# Patient Record
Sex: FEMALE | Race: WHITE | NOT HISPANIC OR LATINO | Employment: OTHER | URBAN - METROPOLITAN AREA
[De-identification: names, ages, dates, MRNs, and addresses within clinical notes are randomized per-mention and may not be internally consistent; named-entity substitution may affect disease eponyms.]

---

## 2018-06-19 ENCOUNTER — OFFICE VISIT (OUTPATIENT)
Dept: URGENT CARE | Facility: CLINIC | Age: 58
End: 2018-06-19
Payer: COMMERCIAL

## 2018-06-19 ENCOUNTER — HOSPITAL ENCOUNTER (OUTPATIENT)
Facility: MEDICAL CENTER | Age: 58
End: 2018-06-19
Attending: PHYSICIAN ASSISTANT
Payer: COMMERCIAL

## 2018-06-19 VITALS
TEMPERATURE: 97.6 F | RESPIRATION RATE: 16 BRPM | SYSTOLIC BLOOD PRESSURE: 110 MMHG | DIASTOLIC BLOOD PRESSURE: 72 MMHG | OXYGEN SATURATION: 95 % | HEART RATE: 80 BPM

## 2018-06-19 DIAGNOSIS — R30.0 DYSURIA: Primary | ICD-10-CM

## 2018-06-19 LAB
APPEARANCE UR: NORMAL
BILIRUB UR STRIP-MCNC: NEGATIVE MG/DL
COLOR UR AUTO: YELLOW
GLUCOSE UR STRIP.AUTO-MCNC: NEGATIVE MG/DL
KETONES UR STRIP.AUTO-MCNC: NEGATIVE MG/DL
LEUKOCYTE ESTERASE UR QL STRIP.AUTO: NORMAL
NITRITE UR QL STRIP.AUTO: NEGATIVE
PH UR STRIP.AUTO: 6 [PH] (ref 5–8)
PROT UR QL STRIP: NEGATIVE MG/DL
RBC UR QL AUTO: NORMAL
SP GR UR STRIP.AUTO: 1.01
UROBILINOGEN UR STRIP-MCNC: NEGATIVE MG/DL

## 2018-06-19 PROCEDURE — 81002 URINALYSIS NONAUTO W/O SCOPE: CPT | Performed by: PHYSICIAN ASSISTANT

## 2018-06-19 PROCEDURE — 87086 URINE CULTURE/COLONY COUNT: CPT

## 2018-06-19 PROCEDURE — 99204 OFFICE O/P NEW MOD 45 MIN: CPT | Performed by: PHYSICIAN ASSISTANT

## 2018-06-19 RX ORDER — PHENAZOPYRIDINE HYDROCHLORIDE 200 MG/1
200 TABLET, FILM COATED ORAL 3 TIMES DAILY
Qty: 6 TAB | Refills: 0 | Status: SHIPPED | OUTPATIENT
Start: 2018-06-19 | End: 2018-06-19 | Stop reason: SDUPTHER

## 2018-06-19 RX ORDER — SULFAMETHOXAZOLE AND TRIMETHOPRIM 800; 160 MG/1; MG/1
1 TABLET ORAL EVERY 12 HOURS
Qty: 14 TAB | Refills: 0 | Status: SHIPPED | OUTPATIENT
Start: 2018-06-19 | End: 2018-06-26

## 2018-06-19 RX ORDER — PHENAZOPYRIDINE HYDROCHLORIDE 200 MG/1
200 TABLET, FILM COATED ORAL 3 TIMES DAILY
Qty: 6 TAB | Refills: 0 | Status: SHIPPED | OUTPATIENT
Start: 2018-06-19 | End: 2018-06-21

## 2018-06-19 RX ORDER — SULFAMETHOXAZOLE AND TRIMETHOPRIM 800; 160 MG/1; MG/1
1 TABLET ORAL EVERY 12 HOURS
Qty: 14 TAB | Refills: 0 | Status: SHIPPED | OUTPATIENT
Start: 2018-06-19 | End: 2018-06-19 | Stop reason: SDUPTHER

## 2018-06-20 DIAGNOSIS — R30.0 DYSURIA: ICD-10-CM

## 2018-06-20 NOTE — PROGRESS NOTES
Subjective:      Pt is a 57 y.o. female who presents with Dysuria (Over a week urinary pain .)            HPI  PT comes into the UC with a chief complaint of dysuria, burning on urination, urgency, frequency, and bladder pressure and has noticed blood in her urine x 7 days. PT denies fevers or chills, CP, SOB, NVD, paresthesias, headaches, dizziness, change in vision, hives, or joint pain. PT states the pain is a 3-4/10 with burning upon urination, aching in nature and worse at night. She states she has not taken any RX meds for this issue. She denies flank or back pain as well. The pt's medication list, problem list, and allergies have been evaluated and reviewed during today's visit.      PMH:  Past Medical History:   Diagnosis Date   • Cholelithiasis    • History of malignant melanoma of skin age 25       PSH:  Past Surgical History:   Procedure Laterality Date   • OTHER      excision of melanoma left lateral rib cage area       Fam Hx:  the patient's family history is not pertinent to their current complaint    Family Status   Relation Status   • Mother Other    no contact with mother   • Father    • Sister Alive   • Brother Alive   • Sister Alive       Soc HX:  Social History     Social History   • Marital status: Single     Spouse name: N/A   • Number of children: 0   • Years of education: N/A     Occupational History   • [employer]  Other     Social History Main Topics   • Smoking status: Former Smoker     Packs/day: 1.00     Years: 35.00     Types: Cigarettes     Quit date: 2014   • Smokeless tobacco: Never Used   • Alcohol use Yes      Comment: rare   • Drug use: No   • Sexual activity: Not Currently     Partners: Male     Other Topics Concern   • Not on file     Social History Narrative   • No narrative on file         Medications:    Current Outpatient Prescriptions:   •  phenazopyridine (PYRIDIUM) 200 MG Tab, Take 1 Tab by mouth 3 times a day for 2 days., Disp: 6 Tab, Rfl: 0  •   sulfamethoxazole-trimethoprim (BACTRIM DS) 800-160 MG tablet, Take 1 Tab by mouth every 12 hours for 7 days., Disp: 14 Tab, Rfl: 0  •  ibuprofen (MOTRIN) 200 MG TABS, Take 200 mg by mouth every 6 hours as needed., Disp: , Rfl:   •  metaxalone (SKELAXIN) 800 MG TABS, Take 1 Tab by mouth 3 times a day., Disp: 45 Tab, Rfl: 0      Allergies:  Patient has no known allergies.    ROS  Review of Systems   Constitutional: Negative for fever, chills and malaise/fatigue.   HENT: Negative for congestion and sore throat.    Eyes: Negative for blurred vision, double vision and photophobia.   Respiratory: Negative for cough and shortness of breath.    Cardiovascular: Negative for chest pain and palpitations.   Gastrointestinal: Negative for nausea, vomiting, abdominal pain, diarrhea and constipation.   Genitourinary: Positive for dysuria, urgency and frequency.    Musculoskeletal: Negative for joint pain and myalgias.   Skin: Negative for rash.   Neurological: Negative for dizziness, tingling and headaches.   Endo/Heme/Allergies: Does not bruise/bleed easily.   Psychiatric/Behavioral: Negative for depression. The patient is not nervous/anxious.           Objective:     /72   Pulse 80   Temp 36.4 °C (97.6 °F)   Resp 16   SpO2 95%      Physical Exam       Physical Exam   Constitutional: She is oriented to person, place, and time. She appears well-developed and well-nourished. No distress.   HENT:   Head: Normocephalic and atraumatic.   Right Ear: External ear normal.   Left Ear: External ear normal.   Nose: Nose normal.   Mouth/Throat: Oropharynx is clear and moist. No oropharyngeal exudate.   Eyes: Conjunctivae normal and EOM are normal. Pupils are equal, round, and reactive to light.   Neck: Normal range of motion. Neck supple. No thyromegaly present.   Cardiovascular: Normal rate, regular rhythm, normal heart sounds and intact distal pulses.  Exam reveals no gallop and no friction rub.    No murmur  heard.  Pulmonary/Chest: Effort normal and breath sounds normal. No respiratory distress. She has no wheezes. She has no rales. She exhibits no tenderness.   Abdominal: Soft. Bowel sounds are normal. She exhibits no distension and no mass. There is no tenderness. There is no rebound and no guarding.   Genitourinary:        Pt deferred   Musculoskeletal: Normal range of motion. She exhibits no edema and no tenderness.   Lymphadenopathy:     She has no cervical adenopathy.   Neurological: She is alert and oriented to person, place, and time. She has normal reflexes. No cranial nerve deficit.   Skin: Skin is warm and dry. No rash noted. No erythema.   Psychiatric: She has a normal mood and affect. Her behavior is normal. Judgment and thought content normal.        Assessment/Plan:     1. Dysuria    - POCT Urinalysis-->LEUKS AND BLOOD  - Urine Culture; Future  - phenazopyridine (PYRIDIUM) 200 MG Tab; Take 1 Tab by mouth 3 times a day for 2 days.  Dispense: 6 Tab; Refill: 0  - sulfamethoxazole-trimethoprim (BACTRIM DS) 800-160 MG tablet; Take 1 Tab by mouth every 12 hours for 7 days.  Dispense: 14 Tab; Refill: 0    Rest, fluids encouraged.  AVS with medical info given.  Pt was in full understanding and agreement with the plan.  Follow-up as needed if symptoms worsen or fail to improve.

## 2018-06-22 ENCOUNTER — TELEPHONE (OUTPATIENT)
Dept: URGENT CARE | Facility: CLINIC | Age: 58
End: 2018-06-22

## 2018-06-22 LAB
BACTERIA UR CULT: NORMAL
SIGNIFICANT IND 70042: NORMAL
SITE SITE: NORMAL
SOURCE SOURCE: NORMAL

## 2018-06-22 NOTE — TELEPHONE ENCOUNTER
Called and left message with pt about urine culture results which came back negative.   I told her she could stop the abx therapy with a neg urine culture.  Encouraged Pt to call back with questions.  Braydon Zuniga PA-C

## 2018-08-01 ENCOUNTER — OFFICE VISIT (OUTPATIENT)
Dept: URGENT CARE | Facility: CLINIC | Age: 58
End: 2018-08-01
Payer: COMMERCIAL

## 2018-08-01 VITALS
RESPIRATION RATE: 18 BRPM | HEIGHT: 67 IN | BODY MASS INDEX: 23.54 KG/M2 | OXYGEN SATURATION: 96 % | HEART RATE: 78 BPM | WEIGHT: 150 LBS | DIASTOLIC BLOOD PRESSURE: 70 MMHG | TEMPERATURE: 98.5 F | SYSTOLIC BLOOD PRESSURE: 110 MMHG

## 2018-08-01 DIAGNOSIS — J01.80 OTHER ACUTE SINUSITIS, RECURRENCE NOT SPECIFIED: ICD-10-CM

## 2018-08-01 DIAGNOSIS — H69.93 DYSFUNCTION OF BOTH EUSTACHIAN TUBES: ICD-10-CM

## 2018-08-01 DIAGNOSIS — R05.9 COUGH: ICD-10-CM

## 2018-08-01 PROCEDURE — 99214 OFFICE O/P EST MOD 30 MIN: CPT | Performed by: FAMILY MEDICINE

## 2018-08-01 RX ORDER — CODEINE PHOSPHATE/GUAIFENESIN 10-100MG/5
10 LIQUID (ML) ORAL 3 TIMES DAILY PRN
Qty: 120 ML | Refills: 0 | Status: SHIPPED | OUTPATIENT
Start: 2018-08-01 | End: 2018-08-06

## 2018-08-01 RX ORDER — AMOXICILLIN 875 MG/1
875 TABLET, COATED ORAL 2 TIMES DAILY
Qty: 20 TAB | Refills: 0 | Status: SHIPPED | OUTPATIENT
Start: 2018-08-01 | End: 2018-08-11

## 2018-08-01 NOTE — PROGRESS NOTES
HPI: Kathy Edmonds is a 57 y.o. female who presents with   Chief Complaint   Patient presents with   • Cough     Over a week dry cough and postnasal dripping .      Patient presents to urgent care with acute onset of a new problem.  She has had 1 week of sinus pressure in the maxillary region malaise some chills.  No fevers there has been fatigue purulent green discharge when she blows her nose she has had some ear pressure she has had a cough has been taking some antihistamine with minimal relief in symptoms.  No history of sinus surgery she states used she is to get twice a year sinusitis until she moved to the [facility] now she gets it once every few years.  Worsened by: activity, laying supine at night, first thing in the morning, when exposed to outside allergens  Improved by: OTC symptomatic medictions    Ros Review of Systems performed. All other systems are negative except for what is listed above.     PMH:  has a past medical history of Cholelithiasis and History of malignant melanoma of skin (age 25).  MEDS:   Current Outpatient Prescriptions:   •  amoxicillin (AMOXIL) 875 MG tablet, Take 1 Tab by mouth 2 times a day for 10 days. With food, Disp: 20 Tab, Rfl: 0  •  guaifenesin-codeine (TUSSI-ORGANIDIN NR) 100-10 MG/5ML syrup, Take 10 mL by mouth 3 times a day as needed for Cough for up to 5 days. Will cause sedation, avoid driving, operating heavy machinery, and drinking alcohol, Disp: 120 mL, Rfl: 0  •  ibuprofen (MOTRIN) 200 MG TABS, Take 200 mg by mouth every 6 hours as needed., Disp: , Rfl:   •  metaxalone (SKELAXIN) 800 MG TABS, Take 1 Tab by mouth 3 times a day., Disp: 45 Tab, Rfl: 0  ALLERGIES: No Known Allergies  SURGHX:   Past Surgical History:   Procedure Laterality Date   • OTHER      excision of melanoma left lateral rib cage area     SOCHX:  reports that she quit smoking about 4 years ago. Her smoking use included Cigarettes. She has a 35.00 pack-year smoking history. She has never used  "smokeless tobacco. She reports that she drinks alcohol. She reports that she does not use drugs.  FH: Family history was reviewed, no pertinent findings to report    PE:  Vitals /70   Pulse 78   Temp 36.9 °C (98.5 °F)   Resp 18   Ht 1.702 m (5' 7\")   Wt 68 kg (150 lb)   SpO2 96%   BMI 23.49 kg/m²    Gen AOx4, NAD  HEENT: moist mucus membranes,  pain and pressure with percussion of bilateral maxillary sinuses.  Bilateral conjunciva clear without erythema or exudate,  Bilateral TM's with erythema bulge, fluid mild pharyngeal erythema without tonsillar exudate or tonsillar enlargement  Neck: supple, no cervical lymphadenopathy, no signs of menigismus  CV/PULM: RRR no murmurs, no rales ronchi or wheezes, no signs of resp distress  Abd soft nontender, bs present  Skin no rashes  Extremities -c/c/e  Neuro appropriate affect,     A/P  1. Other acute sinusitis, recurrence not specified  amoxicillin (AMOXIL) 875 MG tablet    guaifenesin-codeine (TUSSI-ORGANIDIN NR) 100-10 MG/5ML syrup   2. Cough  amoxicillin (AMOXIL) 875 MG tablet    guaifenesin-codeine (TUSSI-ORGANIDIN NR) 100-10 MG/5ML syrup   3. Dysfunction of both eustachian tubes  guaifenesin-codeine (TUSSI-ORGANIDIN NR) 100-10 MG/5ML syrup     Differential diagnosis, natural history, supportive care discussed. Follow-up with primary care provider within 7-10 days, emergency room precautions discussed.  Patient and/or family appears understanding of information.    "

## 2018-09-11 ENCOUNTER — OFFICE VISIT (OUTPATIENT)
Dept: URGENT CARE | Facility: CLINIC | Age: 58
End: 2018-09-11
Payer: COMMERCIAL

## 2018-09-11 VITALS
SYSTOLIC BLOOD PRESSURE: 152 MMHG | BODY MASS INDEX: 23.54 KG/M2 | RESPIRATION RATE: 16 BRPM | HEIGHT: 67 IN | TEMPERATURE: 98.1 F | DIASTOLIC BLOOD PRESSURE: 104 MMHG | WEIGHT: 150 LBS | OXYGEN SATURATION: 99 % | HEART RATE: 72 BPM

## 2018-09-11 DIAGNOSIS — M54.50 ACUTE LEFT-SIDED LOW BACK PAIN WITHOUT SCIATICA: ICD-10-CM

## 2018-09-11 DIAGNOSIS — S39.012A STRAIN OF MUSCLE, FASCIA AND TENDON OF LOWER BACK, INITIAL ENCOUNTER: ICD-10-CM

## 2018-09-11 DIAGNOSIS — M62.838 MUSCLE SPASM: ICD-10-CM

## 2018-09-11 PROCEDURE — 99214 OFFICE O/P EST MOD 30 MIN: CPT | Performed by: NURSE PRACTITIONER

## 2018-09-11 RX ORDER — METAXALONE 800 MG/1
800 TABLET ORAL 3 TIMES DAILY
Qty: 20 TAB | Refills: 0 | Status: SHIPPED | OUTPATIENT
Start: 2018-09-11 | End: 2021-04-16

## 2018-09-11 ASSESSMENT — ENCOUNTER SYMPTOMS
MYALGIAS: 0
CHILLS: 0
BACK PAIN: 1
VOMITING: 0
EYE PAIN: 0
DIZZINESS: 0
SHORTNESS OF BREATH: 0
FEVER: 0
BOWEL INCONTINENCE: 0
SORE THROAT: 0
NAUSEA: 0

## 2018-09-11 ASSESSMENT — PATIENT HEALTH QUESTIONNAIRE - PHQ9: CLINICAL INTERPRETATION OF PHQ2 SCORE: 0

## 2018-09-12 NOTE — PATIENT INSTRUCTIONS
Back Pain, Adult  Back pain is very common in adults. The cause of back pain is rarely dangerous and the pain often gets better over time. The cause of your back pain may not be known. Some common causes of back pain include:  · Strain of the muscles or ligaments supporting the spine.  · Wear and tear (degeneration) of the spinal disks.  · Arthritis.  · Direct injury to the back.  For many people, back pain may return. Since back pain is rarely dangerous, most people can learn to manage this condition on their own.  Follow these instructions at home:  Watch your back pain for any changes. The following actions may help to lessen any discomfort you are feeling:  · Remain active. It is stressful on your back to sit or  one place for long periods of time. Do not sit, drive, or  one place for more than 30 minutes at a time. Take short walks on even surfaces as soon as you are able. Try to increase the length of time you walk each day.  · Exercise regularly as directed by your health care provider. Exercise helps your back heal faster. It also helps avoid future injury by keeping your muscles strong and flexible.  · Do not stay in bed. Resting more than 1-2 days can delay your recovery.  · Pay attention to your body when you bend and lift. The most comfortable positions are those that put less stress on your recovering back. Always use proper lifting techniques, including:  ¨ Bending your knees.  ¨ Keeping the load close to your body.  ¨ Avoiding twisting.  · Find a comfortable position to sleep. Use a firm mattress and lie on your side with your knees slightly bent. If you lie on your back, put a pillow under your knees.  · Avoid feeling anxious or stressed. Stress increases muscle tension and can worsen back pain. It is important to recognize when you are anxious or stressed and learn ways to manage it, such as with exercise.  · Take medicines only as directed by your health care provider.  Over-the-counter medicines to reduce pain and inflammation are often the most helpful. Your health care provider may prescribe muscle relaxant drugs. These medicines help dull your pain so you can more quickly return to your normal activities and healthy exercise.  · Apply ice to the injured area:  ¨ Put ice in a plastic bag.  ¨ Place a towel between your skin and the bag.  ¨ Leave the ice on for 20 minutes, 2-3 times a day for the first 2-3 days. After that, ice and heat may be alternated to reduce pain and spasms.  · Maintain a healthy weight. Excess weight puts extra stress on your back and makes it difficult to maintain good posture.  Contact a health care provider if:  · You have pain that is not relieved with rest or medicine.  · You have increasing pain going down into the legs or buttocks.  · You have pain that does not improve in one week.  · You have night pain.  · You lose weight.  · You have a fever or chills.  Get help right away if:  · You develop new bowel or bladder control problems.  · You have unusual weakness or numbness in your arms or legs.  · You develop nausea or vomiting.  · You develop abdominal pain.  · You feel faint.  This information is not intended to replace advice given to you by your health care provider. Make sure you discuss any questions you have with your health care provider.  Document Released: 12/18/2006 Document Revised: 04/27/2017 Document Reviewed: 04/21/2015  [employer] Interactive Patient Education © 2017 [employer]

## 2018-09-12 NOTE — PROGRESS NOTES
"Subjective:   Kathy Edmonds is a 57 y.o. female who presents for Back Pain (Low back pain, pt. states she pulled a muscle in her lower back this weekend.)  Specifically stated with complaints of progressively worsening back pain ×2 days. Patient was painting a fence when she pulled a muscle in her low back. Patient denies any numbness, tingling, loss of bowel or bladder. Patient has taken ibuprofen with minimal relief in symptoms. Patient denies any chronic back pain.       Back Pain   This is a new problem. Episode onset: 2 days. The problem occurs constantly. The problem is unchanged. The pain is present in the lumbar spine. The quality of the pain is described as shooting. The pain does not radiate. The pain is at a severity of 10/10. The pain is severe. The pain is the same all the time. The symptoms are aggravated by bending, position, standing, sitting and twisting. Stiffness is present all day. Pertinent negatives include no bladder incontinence, bowel incontinence, chest pain or fever. Risk factors include menopause. She has tried nothing for the symptoms. The treatment provided no relief.     Review of Systems   Constitutional: Negative for chills and fever.   HENT: Negative for sore throat.    Eyes: Negative for pain.   Respiratory: Negative for shortness of breath.    Cardiovascular: Negative for chest pain.   Gastrointestinal: Negative for bowel incontinence, nausea and vomiting.   Genitourinary: Negative for bladder incontinence and hematuria.   Musculoskeletal: Positive for back pain. Negative for myalgias.   Skin: Negative for rash.   Neurological: Negative for dizziness.     No Known Allergies   Objective:   /104   Pulse 72   Temp 36.7 °C (98.1 °F)   Resp 16   Ht 1.702 m (5' 7\")   Wt 68 kg (150 lb)   SpO2 99%   Breastfeeding? No   BMI 23.49 kg/m²   Physical Exam   Constitutional: She is oriented to person, place, and time. She appears well-developed and well-nourished. No distress.   "   HENT:   Head: Normocephalic and atraumatic.   Eyes: Pupils are equal, round, and reactive to light. Conjunctivae and EOM are normal.   Cardiovascular: Normal rate and regular rhythm.    No murmur heard.  Pulmonary/Chest: Effort normal and breath sounds normal. No respiratory distress.   Abdominal: Soft. She exhibits no distension. There is no tenderness.   Musculoskeletal:        Lumbar back: She exhibits decreased range of motion, tenderness, pain and spasm.        Back:    Spine- without midline tenderness, step-off or deformity. Without scoliosis or kyphosis. Without noted paraspinous spasm. FROM with lateral bend, and flexion/extension. Without noted tenderness over the sacroiliac notches. Sensation intact bilaterally, BLE motor 5/5 and symmetrical  strength. Negative Straight leg raise.  Gait- WNL without foot drop.      Neurological: She is alert and oriented to person, place, and time. She has normal reflexes. No sensory deficit.   Skin: Skin is warm and dry.   Psychiatric: She has a normal mood and affect.         Assessment/Plan:   Assessment    1. Strain of muscle, fascia and tendon of lower back, initial encounter  metaxalone (SKELAXIN) 800 MG Tab   2. Acute left-sided low back pain without sciatica  metaxalone (SKELAXIN) 800 MG Tab   3. Muscle spasm  metaxalone (SKELAXIN) 800 MG Tab     Avoid bending, stooping, heavy or repetitive lifting until symptoms resolve.  Avoid prolonged sitting; frequent changes of position may be helpful.  Begin gentle stretching before activity when tolerated. May use nsaid therapy. Advised prescribed medication Will cause sedation, avoid driving, operating heavy machinery, and drinking alcohol  Differential diagnosis, natural history, supportive care, and indications for immediate follow-up discussed.

## 2018-09-17 ENCOUNTER — OFFICE VISIT (OUTPATIENT)
Dept: MEDICAL GROUP | Facility: MEDICAL CENTER | Age: 58
End: 2018-09-17
Payer: COMMERCIAL

## 2018-09-17 VITALS
WEIGHT: 152 LBS | HEIGHT: 67 IN | TEMPERATURE: 98 F | RESPIRATION RATE: 16 BRPM | DIASTOLIC BLOOD PRESSURE: 90 MMHG | SYSTOLIC BLOOD PRESSURE: 122 MMHG | OXYGEN SATURATION: 97 % | BODY MASS INDEX: 23.86 KG/M2 | HEART RATE: 90 BPM

## 2018-09-17 DIAGNOSIS — Z85.820 H/O MELANOMA EXCISION: ICD-10-CM

## 2018-09-17 DIAGNOSIS — Z12.31 ENCOUNTER FOR SCREENING MAMMOGRAM FOR BREAST CANCER: ICD-10-CM

## 2018-09-17 DIAGNOSIS — Z12.11 SCREENING FOR COLON CANCER: ICD-10-CM

## 2018-09-17 DIAGNOSIS — Z98.890 H/O MELANOMA EXCISION: ICD-10-CM

## 2018-09-17 DIAGNOSIS — T14.8XXA MUSCLE STRAIN: ICD-10-CM

## 2018-09-17 DIAGNOSIS — Z13.220 LIPID SCREENING: ICD-10-CM

## 2018-09-17 DIAGNOSIS — Z13.29 THYROID DISORDER SCREEN: ICD-10-CM

## 2018-09-17 PROCEDURE — 99214 OFFICE O/P EST MOD 30 MIN: CPT | Performed by: NURSE PRACTITIONER

## 2018-09-17 RX ORDER — METHOCARBAMOL 750 MG/1
750 TABLET, FILM COATED ORAL EVERY 8 HOURS PRN
Qty: 30 TAB | Refills: 0 | Status: SHIPPED | OUTPATIENT
Start: 2018-09-17 | End: 2021-04-16

## 2018-09-17 RX ORDER — METHYLPREDNISOLONE 4 MG/1
TABLET ORAL
Qty: 21 TAB | Refills: 0 | Status: SHIPPED | OUTPATIENT
Start: 2018-09-17 | End: 2020-02-16

## 2018-09-17 NOTE — LETTER
[employer] [facility]  MELANY BanksRANTHONY.  [address]  [place] [address]  Fax: [phone]   Authorization for Release/Disclosure of   Protected Health Information   Name: DEJA MCKINNEY : 1960 SSN: [id number]   Address: [address] [place] [address] [place] [address] Phone:    [phone] (home)    I authorize the entity listed below to release/disclose the PHI below to:   [employer]/MELANY BanksRSUSIE and ROBERT Banks   Provider or Entity Name:  Dr Brooks   Address   City, [facility]   Phone:      Fax:     Reason for request: continuity of care   Information to be released:    [  ] LAST COLONOSCOPY,  including any PATH REPORT and follow-up  [  ] LAST FIT/COLOGUARD RESULT [  ] LAST DEXA  [  ] LAST MAMMOGRAM  [  ] LAST PAP  [  ] LAST LABS [  ] RETINA EXAM REPORT  [  ] IMMUNIZATION RECORDS  [  ] Release all info      [  ] Check here and initial the line next to each item to release ALL health information INCLUDING  _____ Care and treatment for drug and / or alcohol abuse  _____ HIV testing, infection status, or AIDS  _____ Genetic Testing    DATES OF SERVICE OR TIME PERIOD TO BE DISCLOSED: _____________  I understand and acknowledge that:  * This Authorization may be revoked at any time by you in writing, except if your health information has already been used or disclosed.  * Your health information that will be used or disclosed as a result of you signing this authorization could be re-disclosed by the recipient. If this occurs, your re-disclosed health information may no longer be protected by State or Federal laws.  * You may refuse to sign this Authorization. Your refusal will not affect your ability to obtain treatment.  * This Authorization becomes effective upon signing and will  on (date) __________.      If no date is indicated, this Authorization will  one (1) year from the signature date.    Name: Deja Mckinney    Signature:   Date:     2018       PLEASE  FAX REQUESTED RECORDS BACK TO: [phone]

## 2018-09-17 NOTE — PROGRESS NOTES
Subjective:     Chief Complaint   Patient presents with   • Establish Care     CORTISONE SHOT     Kathy Edmonds is a 57 y.o. female here today to establish care, she has not seen a primary care provider in many years.  She has never had mammogram, has not had colonoscopy.  Last labs many years ago.  She is single, works for the state.  She did have Pap smear in 2015 which was normal.  We discussed:     H/O melanoma excision  Excised from the L abdomen, was followed by oncology for 10 years. No recent skin issues    Muscle strain  Primary reason for her visit today. Pt was mistakenly told she could receive a trigger point injection in our office.  Was seen in urgent care 9/11 after straining her lower back while painting. Given prescription for skelaxin which is not helping.  She continues to have lower back discomfort, up to 7 out of 10 with certain movements.  Has difficulty standing from a seated position or turning over when in bed.  She is having muscle spasm in the left lower back.  Denies pain and lower extremities, numbness, tingling, weakness in lower extremities.  No saddle anesthesia       Current medicines (including changes today)  Current Outpatient Prescriptions   Medication Sig Dispense Refill   • methocarbamol (ROBAXIN) 750 MG Tab Take 1 Tab by mouth every 8 hours as needed. 30 Tab 0   • MethylPREDNISolone (MEDROL DOSEPAK) 4 MG Tablet Therapy Pack As directed on the packaging label. 21 Tab 0   • metaxalone (SKELAXIN) 800 MG Tab Take 1 Tab by mouth 3 times a day. 20 Tab 0   • ibuprofen (MOTRIN) 200 MG TABS Take 200 mg by mouth every 6 hours as needed.       No current facility-administered medications for this visit.      She  has a past medical history of Cholelithiasis and History of malignant melanoma of skin (age 25). She also has no past medical history of Asthma.    ROS included above     Objective:     Blood pressure 122/90, pulse 90, temperature 36.7 °C (98 °F), resp. rate 16, height 1.702 m  "(5' 7\"), weight 68.9 kg (152 lb), SpO2 97 %. Body mass index is 23.81 kg/m².     Physical Exam:  General: Alert, oriented in no acute distress.  Eye contact is good, speech is normal, affect calm  Lungs: clear to auscultation bilaterally, normal effort, no wheeze/ rhonchi/ rales.  CV: regular rate and rhythm, S1, S2, no murmur  Abdomen: soft, nontender  MS: SLT negative. DTR 2+ patella. Strength 5/5 prox and distal LE. Mild tenderness in paraspinous muscles lumbar spine without current spasm. No spinous process tenderness.  No pain with stress of SI. Full hip ROM.   Ext: no edema, color normal, vascularity normal, temperature normal    Assessment and Plan:   The following treatment plan was discussed  1. H/O melanoma excision   no recurrence.  Advised follow-up with dermatology for skin check   2. Muscle strain   this is her primary concern today, she strained a muscle in the low back about 2 weeks ago and it is not improving.  She was under the impression she could receive a trigger point injection, this is not something that we do in my office.  Alternative treatment options discussed.  Will start Medrol Dosepak, advised to take medication with food.  May try Robaxin.  Risks benefits and side effects discussed.  MethylPREDNISolone (MEDROL DOSEPAK) 4 MG Tablet Therapy Pack    COMP METABOLIC PANEL   3. Screening for colon cancer  COLOGUARD (FIT DNA)   4. Lipid screening  LIPID PROFILE   5. Thyroid disorder screen  TSH WITH REFLEX TO FT4   6. Encounter for screening mammogram for breast cancer  MA-SCREEN MAMMO W/CAD-BILAT       Followup: pending labs       Please note that this dictation was created using voice recognition software. I have worked with consultants from the vendor as well as technical experts from [employer] to optimize the interface. I have made every reasonable attempt to correct obvious errors, but I expect that there are errors of grammar and possibly content that I did not discover before " finalizing the note.

## 2018-09-17 NOTE — ASSESSMENT & PLAN NOTE
Primary reason for her visit today. Pt was mistakenly told she could receive a trigger point injection in our office.  Was seen in urgent care 9/11 after straining her lower back while painting. Given prescription for skelaxin which is not helping.  She continues to have lower back discomfort, up to 7 out of 10 with certain movements.  Has difficulty standing from a seated position or turning over when in bed.  She is having muscle spasm in the left lower back.  Denies pain and lower extremities, numbness, tingling, weakness in lower extremities.  No saddle anesthesia

## 2018-10-05 ENCOUNTER — HOSPITAL ENCOUNTER (OUTPATIENT)
Dept: LAB | Facility: MEDICAL CENTER | Age: 58
End: 2018-10-05
Attending: NURSE PRACTITIONER
Payer: COMMERCIAL

## 2018-10-05 DIAGNOSIS — Z13.29 THYROID DISORDER SCREEN: ICD-10-CM

## 2018-10-05 DIAGNOSIS — Z13.220 LIPID SCREENING: ICD-10-CM

## 2018-10-05 DIAGNOSIS — T14.8XXA MUSCLE STRAIN: ICD-10-CM

## 2018-10-05 LAB
ALBUMIN SERPL BCP-MCNC: 4.7 G/DL (ref 3.2–4.9)
ALBUMIN/GLOB SERPL: 1.5 G/DL
ALP SERPL-CCNC: 85 U/L (ref 30–99)
ALT SERPL-CCNC: 13 U/L (ref 2–50)
ANION GAP SERPL CALC-SCNC: 7 MMOL/L (ref 0–11.9)
AST SERPL-CCNC: 15 U/L (ref 12–45)
BILIRUB SERPL-MCNC: 1.1 MG/DL (ref 0.1–1.5)
BUN SERPL-MCNC: 16 MG/DL (ref 8–22)
CALCIUM SERPL-MCNC: 10.3 MG/DL (ref 8.5–10.5)
CHLORIDE SERPL-SCNC: 109 MMOL/L (ref 96–112)
CHOLEST SERPL-MCNC: 162 MG/DL (ref 100–199)
CO2 SERPL-SCNC: 23 MMOL/L (ref 20–33)
CREAT SERPL-MCNC: 0.78 MG/DL (ref 0.5–1.4)
FASTING STATUS PATIENT QL REPORTED: NORMAL
GLOBULIN SER CALC-MCNC: 3.1 G/DL (ref 1.9–3.5)
GLUCOSE SERPL-MCNC: 81 MG/DL (ref 65–99)
HDLC SERPL-MCNC: 49 MG/DL
LDLC SERPL CALC-MCNC: 87 MG/DL
POTASSIUM SERPL-SCNC: 4 MMOL/L (ref 3.6–5.5)
PROT SERPL-MCNC: 7.8 G/DL (ref 6–8.2)
SODIUM SERPL-SCNC: 139 MMOL/L (ref 135–145)
TRIGL SERPL-MCNC: 132 MG/DL (ref 0–149)
TSH SERPL DL<=0.005 MIU/L-ACNC: 2.06 UIU/ML (ref 0.38–5.33)

## 2018-10-05 PROCEDURE — 80061 LIPID PANEL: CPT

## 2018-10-05 PROCEDURE — 36415 COLL VENOUS BLD VENIPUNCTURE: CPT

## 2018-10-05 PROCEDURE — 84443 ASSAY THYROID STIM HORMONE: CPT

## 2018-10-05 PROCEDURE — 80053 COMPREHEN METABOLIC PANEL: CPT

## 2018-10-08 ENCOUNTER — TELEPHONE (OUTPATIENT)
Dept: MEDICAL GROUP | Facility: MEDICAL CENTER | Age: 58
End: 2018-10-08

## 2018-10-08 NOTE — TELEPHONE ENCOUNTER
----- Message from ROBERT Banks sent at 10/7/2018  7:57 PM PDT -----  Please inform pt all labs are normal

## 2018-12-26 ENCOUNTER — PATIENT MESSAGE (OUTPATIENT)
Dept: MEDICAL GROUP | Facility: MEDICAL CENTER | Age: 58
End: 2018-12-26

## 2019-01-30 ENCOUNTER — PATIENT MESSAGE (OUTPATIENT)
Dept: MEDICAL GROUP | Facility: MEDICAL CENTER | Age: 59
End: 2019-01-30

## 2019-01-30 NOTE — TELEPHONE ENCOUNTER
From: Kathy Edmonds  To: ROBERT Banks  Sent: 1/30/2019 3:03 PM PST  Subject: Procedure Question    Dr. Chen:  I did not order the test. Your office did. How about you have your office contact the billing office and try to figure out how this bill is going to get paid. The person in your office who ordered the test certainly should have more information about this than the billing Dept.  ----- Message -----  From: ROBERT Banks  Sent: 1/30/2019 2:33 PM PST  To: Kathy Edmonds  Subject: RE: Procedure Question  * control OR involvement. Sorry, my dictation software does not always understand me.    ----- Message -----   From: Kathy Edmonds   Sent: 1/30/2019 8:25 AM PST   To: ROBERT Banks  Subject: Procedure Question    Dr. Chen:  My claim for the Cologard test you scheduled for me on October 4, 2018 has been denied a second time by my insurer. The reason is because the lab you used was out of my network. This is not my fault. Why wasn't this arranged to be tested in a lab used by my insurance company, of which I'm told there are two that are used often in my Network. And if there was any doubt about what lab to use, you could have called me and I would have contacted my insurance company prior to the test being ordered. I'm very upset about this. Had it been sent to a lab in my network, my insurance company would have covered it as preventative. Please, I need you to take care of this problem.

## 2019-03-30 ENCOUNTER — OFFICE VISIT (OUTPATIENT)
Dept: URGENT CARE | Facility: CLINIC | Age: 59
End: 2019-03-30
Payer: COMMERCIAL

## 2019-03-30 VITALS
RESPIRATION RATE: 16 BRPM | HEIGHT: 67 IN | OXYGEN SATURATION: 91 % | BODY MASS INDEX: 23.86 KG/M2 | DIASTOLIC BLOOD PRESSURE: 74 MMHG | TEMPERATURE: 99.3 F | SYSTOLIC BLOOD PRESSURE: 118 MMHG | WEIGHT: 152 LBS | HEART RATE: 84 BPM

## 2019-03-30 DIAGNOSIS — J01.40 ACUTE PANSINUSITIS, RECURRENCE NOT SPECIFIED: ICD-10-CM

## 2019-03-30 DIAGNOSIS — R06.2 WHEEZING: ICD-10-CM

## 2019-03-30 DIAGNOSIS — J98.8 RESPIRATORY INFECTION: ICD-10-CM

## 2019-03-30 DIAGNOSIS — R05.9 COUGH: ICD-10-CM

## 2019-03-30 PROCEDURE — 99214 OFFICE O/P EST MOD 30 MIN: CPT | Performed by: PHYSICIAN ASSISTANT

## 2019-03-30 RX ORDER — DOXYCYCLINE HYCLATE 100 MG
100 TABLET ORAL 2 TIMES DAILY
Qty: 14 TAB | Refills: 0 | Status: SHIPPED | OUTPATIENT
Start: 2019-03-30 | End: 2019-04-06

## 2019-03-30 RX ORDER — IPRATROPIUM BROMIDE AND ALBUTEROL SULFATE 2.5; .5 MG/3ML; MG/3ML
3 SOLUTION RESPIRATORY (INHALATION) ONCE
Status: COMPLETED | OUTPATIENT
Start: 2019-03-30 | End: 2019-03-30

## 2019-03-30 RX ADMIN — IPRATROPIUM BROMIDE AND ALBUTEROL SULFATE 3 ML: 2.5; .5 SOLUTION RESPIRATORY (INHALATION) at 17:08

## 2019-04-01 ENCOUNTER — TELEPHONE (OUTPATIENT)
Dept: URGENT CARE | Facility: CLINIC | Age: 59
End: 2019-04-01

## 2019-04-01 RX ORDER — METHYLPREDNISOLONE 4 MG/1
TABLET ORAL
Qty: 1 KIT | Refills: 0 | Status: SHIPPED | OUTPATIENT
Start: 2019-04-01 | End: 2020-02-16

## 2019-04-01 RX ORDER — ALBUTEROL SULFATE 90 UG/1
2 AEROSOL, METERED RESPIRATORY (INHALATION) EVERY 6 HOURS PRN
Qty: 8.5 G | Refills: 0 | Status: SHIPPED | OUTPATIENT
Start: 2019-04-01 | End: 2021-04-16

## 2019-04-01 NOTE — TELEPHONE ENCOUNTER
Pt called requesting a steroid, she thought you were going to send it to her pharmacy on [address] but they did not  Receive it.. Also she was hoping you could send in an inhaler for her.. Please advise. Thanks. jada

## 2019-04-02 ASSESSMENT — ENCOUNTER SYMPTOMS
SPUTUM PRODUCTION: 1
SINUS PAIN: 1
CHILLS: 0
COUGH: 1
WHEEZING: 1
SORE THROAT: 1
FEVER: 0
SHORTNESS OF BREATH: 1
DIARRHEA: 0
DIZZINESS: 0
EYE REDNESS: 0
HEMOPTYSIS: 0
NECK PAIN: 0
EYE DISCHARGE: 0
VOMITING: 0
RHINORRHEA: 1
PALPITATIONS: 0
HEADACHES: 1
MYALGIAS: 1

## 2019-04-02 NOTE — PROGRESS NOTES
"Subjective:      Kathy Edmonds is a 58 y.o. female who presents with Cough (x wednesday, sinus congestion, headaches, non-productive)            Cough   This is a new problem. Episode onset: 5 days ago. The problem has been waxing and waning. The problem occurs every few minutes. Cough characteristics: Rare sputum production. Associated symptoms include ear congestion, headaches, myalgias, nasal congestion, postnasal drip, rhinorrhea, a sore throat, shortness of breath and wheezing. Pertinent negatives include no chest pain, chills, ear pain, eye redness, fever, hemoptysis or rash. The symptoms are aggravated by lying down. She has tried OTC cough suppressant for the symptoms. The treatment provided mild relief. There is no history of pneumonia.       Review of Systems   Constitutional: Positive for malaise/fatigue. Negative for chills and fever.   HENT: Positive for congestion, postnasal drip, rhinorrhea, sinus pain and sore throat. Negative for ear discharge and ear pain.    Eyes: Negative for discharge and redness.   Respiratory: Positive for cough, sputum production, shortness of breath and wheezing. Negative for hemoptysis.    Cardiovascular: Negative for chest pain and palpitations.   Gastrointestinal: Negative for diarrhea and vomiting.   Genitourinary: Negative for dysuria and urgency.   Musculoskeletal: Positive for myalgias. Negative for neck pain.   Skin: Negative for itching and rash.   Neurological: Positive for headaches. Negative for dizziness.   All other systems reviewed and are negative.         Objective:     /74 (BP Location: Left arm, Patient Position: Sitting, BP Cuff Size: Adult)   Pulse 84   Temp 37.4 °C (99.3 °F) (Temporal)   Resp 16   Ht 1.702 m (5' 7\")   Wt 68.9 kg (152 lb)   SpO2 91%   BMI 23.81 kg/m²    PMH:  has a past medical history of Cholelithiasis and History of malignant melanoma of skin (age 25). She also has no past medical history of Asthma.  MEDS:   Current " Outpatient Prescriptions:   •  MethylPREDNISolone (MEDROL DOSEPAK) 4 MG Tablet Therapy Pack, UAD, Disp: 1 Kit, Rfl: 0  •  albuterol 108 (90 Base) MCG/ACT Aero Soln inhalation aerosol, Inhale 2 Puffs by mouth every 6 hours as needed for Shortness of Breath., Disp: 8.5 g, Rfl: 0  •  doxycycline (VIBRAMYCIN) 100 MG Tab, Take 1 Tab by mouth 2 times a day for 7 days., Disp: 14 Tab, Rfl: 0  •  methocarbamol (ROBAXIN) 750 MG Tab, Take 1 Tab by mouth every 8 hours as needed., Disp: 30 Tab, Rfl: 0  •  MethylPREDNISolone (MEDROL DOSEPAK) 4 MG Tablet Therapy Pack, As directed on the packaging label., Disp: 21 Tab, Rfl: 0  •  metaxalone (SKELAXIN) 800 MG Tab, Take 1 Tab by mouth 3 times a day., Disp: 20 Tab, Rfl: 0  •  ibuprofen (MOTRIN) 200 MG TABS, Take 200 mg by mouth every 6 hours as needed., Disp: , Rfl:   ALLERGIES: No Known Allergies  SURGHX:   Past Surgical History:   Procedure Laterality Date   • CHOLECYSTECTOMY     • OTHER      excision of melanoma left lateral rib cage area     SOCHX:  reports that she quit smoking about 4 years ago. Her smoking use included Cigarettes. She has a 35.00 pack-year smoking history. She has never used smokeless tobacco. She reports that she drinks alcohol. She reports that she does not use drugs.  FH: Family history was reviewed, no pertinent findings to report    Physical Exam   Constitutional: She is oriented to person, place, and time. She appears well-developed and well-nourished.   HENT:   Head: Normocephalic and atraumatic.   Mouth/Throat: No oropharyngeal exudate.   Ears- Canals clear- TM- with clear fluid effusions bilaterally.   Pos. PND, with slight erythema- without tonsillar edema or exudate.   Mild discharge noted bilaterally- to nares.      Eyes: Pupils are equal, round, and reactive to light. EOM are normal.   Neck: Normal range of motion. Neck supple.   Cardiovascular: Normal rate and regular rhythm.    No murmur heard.  Pulmonary/Chest: Effort normal. No respiratory  distress. She has wheezes.   Musculoskeletal: Normal range of motion. She exhibits no tenderness.   Lymphadenopathy:     She has no cervical adenopathy.   Neurological: She is alert and oriented to person, place, and time.   Skin: Skin is warm. No rash noted.   Psychiatric: She has a normal mood and affect. Her behavior is normal.   Vitals reviewed.              Assessment/Plan:     1. Respiratory infection  - doxycycline (VIBRAMYCIN) 100 MG Tab; Take 1 Tab by mouth 2 times a day for 7 days.  Dispense: 14 Tab; Refill: 0    2. Cough  - ipratropium-albuterol (DUONEB) nebulizer solution; 3 mL by Nebulization route Once.  - MethylPREDNISolone (MEDROL DOSEPAK) 4 MG Tablet Therapy Pack; UAD  Dispense: 1 Kit; Refill: 0  - albuterol 108 (90 Base) MCG/ACT Aero Soln inhalation aerosol; Inhale 2 Puffs by mouth every 6 hours as needed for Shortness of Breath.  Dispense: 8.5 g; Refill: 0    3. Wheezing  - ipratropium-albuterol (DUONEB) nebulizer solution; 3 mL by Nebulization route Once.  - MethylPREDNISolone (MEDROL DOSEPAK) 4 MG Tablet Therapy Pack; UAD  Dispense: 1 Kit; Refill: 0  - albuterol 108 (90 Base) MCG/ACT Aero Soln inhalation aerosol; Inhale 2 Puffs by mouth every 6 hours as needed for Shortness of Breath.  Dispense: 8.5 g; Refill: 0    4. Acute pansinusitis, recurrence not specified  - doxycycline (VIBRAMYCIN) 100 MG Tab; Take 1 Tab by mouth 2 times a day for 7 days.  Dispense: 14 Tab; Refill: 0     Encouraged OTC supportive meds PRN.  Discussed side effects of the antibiotic today along with a steroid of which I encourage patient to be can use tomorrow in the morning to avoid insomnia.  Humidification, increase fluids, avoid night time dairy.  Pulse ox rechecked and was at 96% on room air after breathing treatment.  Patient given precautionary s/sx that mandate immediate follow up and evaluation in the ED. Advised of risks of not doing so.    DDX, Supportive care, and indications for immediate follow-up discussed  with patient.    Instructed to return to clinic or nearest emergency department if we are not available for any change in condition, further concerns, or worsening of symptoms.    The patient demonstrated a good understanding and agreed with the treatment plan.

## 2019-10-21 ENCOUNTER — OFFICE VISIT (OUTPATIENT)
Dept: URGENT CARE | Facility: CLINIC | Age: 59
End: 2019-10-21
Payer: COMMERCIAL

## 2019-10-21 VITALS
HEART RATE: 80 BPM | BODY MASS INDEX: 24.59 KG/M2 | DIASTOLIC BLOOD PRESSURE: 80 MMHG | WEIGHT: 157 LBS | TEMPERATURE: 98.2 F | RESPIRATION RATE: 20 BRPM | SYSTOLIC BLOOD PRESSURE: 120 MMHG | OXYGEN SATURATION: 96 %

## 2019-10-21 DIAGNOSIS — J22 LRTI (LOWER RESPIRATORY TRACT INFECTION): ICD-10-CM

## 2019-10-21 PROCEDURE — 99214 OFFICE O/P EST MOD 30 MIN: CPT | Performed by: PHYSICIAN ASSISTANT

## 2019-10-21 RX ORDER — CODEINE PHOSPHATE AND GUAIFENESIN 10; 100 MG/5ML; MG/5ML
5 SOLUTION ORAL EVERY 4 HOURS PRN
Qty: 100 ML | Refills: 0 | Status: SHIPPED | OUTPATIENT
Start: 2019-10-21 | End: 2019-10-26

## 2019-10-21 RX ORDER — AMOXICILLIN 500 MG/1
500 CAPSULE ORAL 3 TIMES DAILY
Qty: 30 CAP | Refills: 0 | Status: SHIPPED | OUTPATIENT
Start: 2019-10-21 | End: 2020-02-16

## 2019-10-21 ASSESSMENT — ENCOUNTER SYMPTOMS
SINUS PRESSURE: 1
FEVER: 0
CHILLS: 0
HEADACHES: 1
SPUTUM PRODUCTION: 1
SHORTNESS OF BREATH: 1
COUGH: 1
SINUS PAIN: 1

## 2019-10-21 NOTE — PROGRESS NOTES
Subjective:   Kathy Edmonds is a 59 y.o. female who presents today with   Chief Complaint   Patient presents with   • Sinus Problem       Sinus Problem   This is a new problem. The current episode started in the past 7 days. The problem has been gradually worsening since onset. There has been no fever. The pain is mild. Associated symptoms include congestion, coughing, headaches, shortness of breath and sinus pressure. Pertinent negatives include no chills. Past treatments include oral decongestants. The treatment provided no relief.   Cough   This is a new problem. The current episode started in the past 7 days. The problem has been gradually worsening. The problem occurs constantly. The cough is productive of sputum. Associated symptoms include headaches and shortness of breath. Pertinent negatives include no chills or fever. Risk factors for lung disease include smoking/tobacco exposure. Treatments tried: decongestant. The treatment provided no relief. Her past medical history is significant for bronchitis and pneumonia.     Patient has a history of bronchitis and pneumonia.  PMH:  has a past medical history of Cholelithiasis and History of malignant melanoma of skin (age 25). She also has no past medical history of Asthma.  MEDS:   Current Outpatient Medications:   •  guaifenesin-codeine (ROBITUSSIN AC) Solution oral solution, Take 5 mL by mouth every four hours as needed for Cough for up to 5 days., Disp: 100 mL, Rfl: 0  •  amoxicillin (AMOXIL) 500 MG Cap, Take 1 Cap by mouth 3 times a day., Disp: 30 Cap, Rfl: 0  •  MethylPREDNISolone (MEDROL DOSEPAK) 4 MG Tablet Therapy Pack, UAD (Patient not taking: Reported on 10/21/2019), Disp: 1 Kit, Rfl: 0  •  albuterol 108 (90 Base) MCG/ACT Aero Soln inhalation aerosol, Inhale 2 Puffs by mouth every 6 hours as needed for Shortness of Breath. (Patient not taking: Reported on 10/21/2019), Disp: 8.5 g, Rfl: 0  •  methocarbamol (ROBAXIN) 750 MG Tab, Take 1 Tab by mouth  every 8 hours as needed. (Patient not taking: Reported on 10/21/2019), Disp: 30 Tab, Rfl: 0  •  MethylPREDNISolone (MEDROL DOSEPAK) 4 MG Tablet Therapy Pack, As directed on the packaging label. (Patient not taking: Reported on 10/21/2019), Disp: 21 Tab, Rfl: 0  •  metaxalone (SKELAXIN) 800 MG Tab, Take 1 Tab by mouth 3 times a day. (Patient not taking: Reported on 10/21/2019), Disp: 20 Tab, Rfl: 0  •  ibuprofen (MOTRIN) 200 MG TABS, Take 200 mg by mouth every 6 hours as needed., Disp: , Rfl:   ALLERGIES: No Known Allergies  SURGHX:   Past Surgical History:   Procedure Laterality Date   • CHOLECYSTECTOMY     • OTHER      excision of melanoma left lateral rib cage area     SOCHX:  reports that she quit smoking about 5 years ago. Her smoking use included cigarettes. She has a 35.00 pack-year smoking history. She has never used smokeless tobacco. She reports that she drinks alcohol. She reports that she does not use drugs.  FH: Reviewed with patient, not pertinent to this visit.       Review of Systems   Constitutional: Negative for chills and fever.   HENT: Positive for congestion, sinus pressure and sinus pain.    Respiratory: Positive for cough, sputum production and shortness of breath.    Neurological: Positive for headaches.   All other systems reviewed and are negative.       Objective:   /80   Pulse 80   Temp 36.8 °C (98.2 °F) (Temporal)   Resp 20   Wt 71.2 kg (157 lb)   SpO2 96%   BMI 24.59 kg/m²   Physical Exam   Constitutional: She appears well-developed and well-nourished. No distress.   HENT:   Head: Normocephalic and atraumatic.   Right Ear: Hearing, tympanic membrane and ear canal normal.   Left Ear: Hearing, tympanic membrane and ear canal normal.   Nose: Rhinorrhea present.   Mouth/Throat: Posterior oropharyngeal erythema present. No tonsillar exudate.   Tenderness to palpation of maxillary/frontal sinuses   Eyes: Pupils are equal, round, and reactive to light.   Cardiovascular: Normal rate,  regular rhythm and normal heart sounds.   Pulmonary/Chest: Effort normal and breath sounds normal. No stridor. No respiratory distress. She has no wheezes. She has no rales.   Musculoskeletal:   Normal movement in all 4 extremities   Neurological: She is alert. Coordination normal.   Skin: Skin is warm and dry.   Psychiatric: She has a normal mood and affect.   Nursing note and vitals reviewed.  Congested cough appreciated upon examination    Assessment/Plan:   Assessment    1. LRTI (lower respiratory tract infection)  - guaifenesin-codeine (ROBITUSSIN AC) Solution oral solution; Take 5 mL by mouth every four hours as needed for Cough for up to 5 days.  Dispense: 100 mL; Refill: 0  - amoxicillin (AMOXIL) 500 MG Cap; Take 1 Cap by mouth 3 times a day.  Dispense: 30 Cap; Refill: 0  Differential diagnosis, natural history, supportive care, and indications for immediate follow-up discussed.   Patient given instructions and understanding of medications and treatment.    If not improving in 3-5 days, F/U with PCP or return to  if symptoms worsen.    Patient agreeable to plan.      Please note that this dictation was created using voice recognition software. I have made every reasonable attempt to correct obvious errors, but I expect that there are errors of grammar and possibly content that I did not discover before finalizing the note.    Israel Hawley PA-C

## 2020-01-09 ENCOUNTER — OFFICE VISIT (OUTPATIENT)
Dept: MEDICAL GROUP | Facility: MEDICAL CENTER | Age: 60
End: 2020-01-09
Payer: COMMERCIAL

## 2020-01-09 VITALS
BODY MASS INDEX: 24.48 KG/M2 | SYSTOLIC BLOOD PRESSURE: 122 MMHG | WEIGHT: 156 LBS | OXYGEN SATURATION: 93 % | TEMPERATURE: 98.3 F | DIASTOLIC BLOOD PRESSURE: 80 MMHG | HEIGHT: 67 IN | RESPIRATION RATE: 16 BRPM | HEART RATE: 79 BPM

## 2020-01-09 DIAGNOSIS — R20.0 FACIAL NUMBNESS: ICD-10-CM

## 2020-01-09 DIAGNOSIS — R42 VERTIGO: ICD-10-CM

## 2020-01-09 PROCEDURE — 99213 OFFICE O/P EST LOW 20 MIN: CPT | Performed by: NURSE PRACTITIONER

## 2020-01-09 ASSESSMENT — PATIENT HEALTH QUESTIONNAIRE - PHQ9: CLINICAL INTERPRETATION OF PHQ2 SCORE: 0

## 2020-01-10 ENCOUNTER — PATIENT MESSAGE (OUTPATIENT)
Dept: MEDICAL GROUP | Facility: MEDICAL CENTER | Age: 60
End: 2020-01-10

## 2020-01-10 NOTE — TELEPHONE ENCOUNTER
From: Kathy Edmonds  To: ROBERT Banks  Sent: 1/10/2020 9:35 AM PST  Subject: Test Result Question    Dr. Chen:  This is the MRI I discussed with you. Everything is fine until you get to the bottom...then it gets weird.

## 2020-01-13 NOTE — TELEPHONE ENCOUNTER
From: Kathy Edmonds  To: ROBERT Banks  Sent: 1/10/2020 3:09 PM PST  Subject: Test Result Question    I think it is Dr. Cedeno. [phone] Radiologist.  ----- Message -----  From: ROBERT Banks  Sent: 1/10/2020 1:33 PM PST  To: Kathy Edmonds  Subject: RE: Test Result Question  Jose Chavez,  Thank you for sending that. When we are doing medical reports we often use a dictation microphone, sometimes this results in typos which I think has happened on your report. Do you have the phone number for the facility where this was done so that I can call them and get someone to look at it?  ROBERT Banks      ----- Message -----   From: Kathy Edmonds   Sent: 1/10/2020 9:35 AM PST   To: ROBERT Banks  Subject: Test Result Question    Dr. Chen:  This is the MRI I discussed with you. Everything is fine until you get to the bottom...then it gets weird.
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
## 2020-02-16 ENCOUNTER — OFFICE VISIT (OUTPATIENT)
Dept: URGENT CARE | Facility: CLINIC | Age: 60
End: 2020-02-16
Payer: COMMERCIAL

## 2020-02-16 VITALS
HEART RATE: 88 BPM | BODY MASS INDEX: 24.33 KG/M2 | OXYGEN SATURATION: 95 % | RESPIRATION RATE: 20 BRPM | SYSTOLIC BLOOD PRESSURE: 110 MMHG | WEIGHT: 155 LBS | DIASTOLIC BLOOD PRESSURE: 68 MMHG | HEIGHT: 67 IN | TEMPERATURE: 99.9 F

## 2020-02-16 DIAGNOSIS — J22 LOWER RESPIRATORY INFECTION: ICD-10-CM

## 2020-02-16 PROCEDURE — 99214 OFFICE O/P EST MOD 30 MIN: CPT | Performed by: PHYSICIAN ASSISTANT

## 2020-02-16 RX ORDER — PREDNISONE 20 MG/1
40 TABLET ORAL DAILY
Qty: 10 TAB | Refills: 0 | Status: SHIPPED | OUTPATIENT
Start: 2020-02-16 | End: 2020-02-21

## 2020-02-16 RX ORDER — AMOXICILLIN 500 MG/1
500 CAPSULE ORAL 3 TIMES DAILY
Qty: 30 CAP | Refills: 0 | Status: SHIPPED | OUTPATIENT
Start: 2020-02-16 | End: 2020-02-26

## 2020-02-16 RX ORDER — AZITHROMYCIN 250 MG/1
TABLET, FILM COATED ORAL
Qty: 6 TAB | Refills: 0 | Status: SHIPPED
Start: 2020-02-16 | End: 2020-02-16

## 2020-02-16 RX ORDER — BENZONATATE 100 MG/1
100 CAPSULE ORAL 3 TIMES DAILY PRN
Qty: 21 CAP | Refills: 0 | Status: SHIPPED | OUTPATIENT
Start: 2020-02-16 | End: 2021-04-16

## 2020-02-16 ASSESSMENT — ENCOUNTER SYMPTOMS
HEMOPTYSIS: 0
CHILLS: 1
SORE THROAT: 0
EYES NEGATIVE: 1
RHINORRHEA: 0
WHEEZING: 1
SHORTNESS OF BREATH: 0
COUGH: 1
SINUS PAIN: 0

## 2020-02-16 ASSESSMENT — COPD QUESTIONNAIRES: COPD: 0

## 2020-02-16 NOTE — PROGRESS NOTES
"Subjective:      Kathy Edmonds is a 59 y.o. female who presents with Cough (5 days ago fever , hills, dry cough , chest congesiton and rib cage sore)            Cough   This is a new problem. The current episode started in the past 7 days. The problem has been gradually worsening. The problem occurs every few minutes. The cough is non-productive. Associated symptoms include chills and wheezing. Pertinent negatives include no chest pain, ear pain, hemoptysis, nasal congestion, rash, rhinorrhea, sore throat or shortness of breath. Associated symptoms comments: Chest discomfort from coughing. Nothing aggravates the symptoms. Treatments tried: Ibuprofen. The treatment provided mild relief. Her past medical history is significant for pneumonia. There is no history of asthma or COPD.     Last Thursday 101.3F. Chills.   Fever and chills resolved.   She complains of her lingering cough.     Review of Systems   Constitutional: Positive for chills.   HENT: Positive for congestion. Negative for ear pain, rhinorrhea, sinus pain and sore throat.    Eyes: Negative.    Respiratory: Positive for cough and wheezing. Negative for hemoptysis and shortness of breath.    Cardiovascular: Negative for chest pain.   Skin: Negative for rash.          Objective:     /68   Pulse 88   Temp 37.7 °C (99.9 °F) (Temporal)   Resp 20   Ht 1.702 m (5' 7\")   Wt 70.3 kg (155 lb)   SpO2 95%   BMI 24.28 kg/m²      Physical Exam  Vitals signs reviewed.   Constitutional:       Appearance: Normal appearance.   HENT:      Right Ear: Tympanic membrane normal.      Left Ear: Tympanic membrane normal.      Nose: Rhinorrhea present.      Mouth/Throat:      Mouth: Mucous membranes are moist.      Pharynx: Posterior oropharyngeal erythema present. No oropharyngeal exudate.   Eyes:      Conjunctiva/sclera: Conjunctivae normal.   Cardiovascular:      Rate and Rhythm: Normal rate.      Pulses: Normal pulses.      Heart sounds: Normal heart sounds. "   Pulmonary:      Effort: No respiratory distress or retractions.      Breath sounds: Normal breath sounds. No wheezing, rhonchi or rales.      Comments: Mild increased respiratory effort   Skin:     General: Skin is warm and dry.   Neurological:      General: No focal deficit present.      Mental Status: She is alert and oriented to person, place, and time.   Psychiatric:         Behavior: Behavior normal.       Past Medical History:   Diagnosis Date   • Cholelithiasis    • History of malignant melanoma of skin age 25      Past Surgical History:   Procedure Laterality Date   • CHOLECYSTECTOMY     • OTHER      excision of melanoma left lateral rib cage area      Social History     Socioeconomic History   • Marital status: Single     Spouse name: Not on file   • Number of children: 0   • Years of education: Not on file   • Highest education level: Not on file   Occupational History   • Occupation: free [employer]     Employer: OTHER   Social Needs   • Financial resource strain: Not on file   • Food insecurity     Worry: Not on file     Inability: Not on file   • Transportation needs     Medical: Not on file     Non-medical: Not on file   Tobacco Use   • Smoking status: Former Smoker     Packs/day: 1.00     Years: 35.00     Pack years: 35.00     Types: Cigarettes     Last attempt to quit: 2014     Years since quittin.8   • Smokeless tobacco: Never Used   Substance and Sexual Activity   • Alcohol use: Yes     Comment: rare   • Drug use: No   • Sexual activity: Not Currently     Partners: Male   Lifestyle   • Physical activity     Days per week: Not on file     Minutes per session: Not on file   • Stress: Not on file   Relationships   • Social connections     Talks on phone: Not on file     Gets together: Not on file     Attends Taoist service: Not on file     Active member of club or organization: Not on file     Attends meetings of clubs or organizations: Not on file     Relationship status: Not on  "file   • Intimate partner violence     Fear of current or ex partner: Not on file     Emotionally abused: Not on file     Physically abused: Not on file     Forced sexual activity: Not on file   Other Topics Concern   • Not on file   Social History Narrative   • Not on file    Patient has no known allergies.            Assessment/Plan:   1. Lower respiratory infection    - predniSONE (DELTASONE) 20 MG Tab; Take 2 Tabs by mouth every day for 5 days.  Dispense: 10 Tab; Refill: 0  - benzonatate (TESSALON) 100 MG Cap; Take 1 Cap by mouth 3 times a day as needed for Cough.  Dispense: 21 Cap; Refill: 0  - amoxicillin (AMOXIL) 500 MG Cap; Take 1 Cap by mouth 3 times a day for 10 days.  Dispense: 30 Cap; Refill: 0    Discussed with patient symptoms of lower respiratory infection.  Due to history of pneumonia and low-grade fevers and recurrent cough, will cover for bacteria.  Patient declines azithromycin due to \"FDA warning of heart issues\".      Encourage plenty fluids, ibuprofen Tylenol for fevers and aches.     Supportive care, differential diagnoses, and indications for immediate follow-up discussed with patient.    Pathogenesis of diagnosis discussed including typical length and natural progression. Patient expresses understanding and agrees to plan.    Please note that this dictation was created using voice recognition software. I have made every reasonable attempt to correct obvious errors, but I expect that there are errors of grammar and possibly content that I did not discover before finalizing the note.        "

## 2020-07-01 ENCOUNTER — PATIENT MESSAGE (OUTPATIENT)
Dept: MEDICAL GROUP | Facility: MEDICAL CENTER | Age: 60
End: 2020-07-01

## 2020-07-01 NOTE — TELEPHONE ENCOUNTER
From: Kathy Edmonds  To: ROBERT Banks  Sent: 7/1/2020 11:16 AM PDT  Subject: Non-Urgent Medical Question    Thank you for getting back to me. I don't need imaging. I know it is a muscle. I've had it before. But thanks. I'll just deal with the pain.      ----- Message -----   From:ROBERT Banks   Sent:7/1/2020 10:53 AM PDT   To:Kathy Edmonds   Subject:RE: Non-Urgent Medical Question    Jose Chavez,  Sorry to hear that you are having difficulty with your back. We can certainly do a referral to a pain management specialist to consider an injection, but I first have to see you in the office for an exam and obtain some imaging. Can you schedule an appointment?  Marcella NEVILLE      ----- Message -----   From:Kathy Edmonds   Sent:7/1/2020 10:07 AM PDT   To:ROBERT Banks    Subject:Non-Urgent Medical Question    Dr. Chen,  I would like to have a referral so I can get a steroid shot in my upper back (shoulder area) muscle. Been killing me for a week. Please let me know if you can give me this referral or do it yourself and I'll make an appointment. I've had it done several times in past years when this muscle goes crazy.

## 2020-12-18 ENCOUNTER — PATIENT MESSAGE (OUTPATIENT)
Dept: MEDICAL GROUP | Facility: MEDICAL CENTER | Age: 60
End: 2020-12-18

## 2020-12-18 DIAGNOSIS — Z13.21 ENCOUNTER FOR VITAMIN DEFICIENCY SCREENING: ICD-10-CM

## 2020-12-18 DIAGNOSIS — Z86.19 HISTORY OF VIRAL ILLNESS: ICD-10-CM

## 2020-12-18 DIAGNOSIS — Z13.29 SCREENING FOR THYROID DISORDER: ICD-10-CM

## 2020-12-18 DIAGNOSIS — Z00.00 ANNUAL PHYSICAL EXAM: ICD-10-CM

## 2020-12-18 DIAGNOSIS — Z13.220 LIPID SCREENING: ICD-10-CM

## 2020-12-18 DIAGNOSIS — Z13.0 SCREENING FOR DEFICIENCY ANEMIA: ICD-10-CM

## 2020-12-20 NOTE — TELEPHONE ENCOUNTER
From: Kathy Edmonds  To: ROBERT Banks  Sent: 12/18/2020 5:42 PM PST  Subject: Non-Urgent Medical Question    Thank you Dr. CHEN.Do you know if my insurance covers this blood work and the antibody test?      ----- Message -----   From:ROBERT Banks   Sent:12/18/2020 4:13 PM PST   To:Kathy Edmonds   Subject:RE: Non-Urgent Medical Question    Jose Chavez,  I have ordered routine blood work for you and included an antibody test for Covid. We will mail you the paperwork. Or, if you use the [facility], you can go and without paperwork and they will look it up in the computer.  Please be sure that you are fasting 8 to 10 hours prior. Water is okay.  Please schedule an appointment to review your results.  Marcella NEVILLE      ----- Message -----   From:Kathy Edmonds   Sent:12/18/2020 1:31 PM PST   To:ROBERT Banks   Subject:RE: Non-Urgent Medical Question    I just want an appointment to get blood work done. Can't she just schedule that?      ----- Message -----   From:Hailey Friedman, Med Ass't   Sent:12/18/2020 12:53 PM PST   To:Kathy Johnsonara   Subject:RE: Non-Urgent Medical Question    Hello,  Please schedule an appointment with Marcella to discuss further. You were last seen in January of this year and she wanted to get blood work done, depending on those results she was going to decide when to have you follow up. Please call [phone] to make an appointment to discuss with her. We can always do a virtual visit too.  Please let me know if there is anything else I can do to help,  Hailey REIS  Medical Assistant      ----- Message -----   From:Kathy Edmonds   Sent:12/18/2020 12:03 PM PST   To:ROBERT Banks   Subject:Non-Urgent Medical Question    Dr. Chen.  I would like to get a full blood work up. I think we did this like two years ago. Do you know if they test for Covid Antibodies? I'd like to have that blood test if my insurance will cover it.

## 2021-01-10 LAB
25(OH)D3+25(OH)D2 SERPL-MCNC: 25.5 NG/ML (ref 30–100)
ALBUMIN SERPL-MCNC: 4.4 G/DL (ref 3.8–4.8)
ALBUMIN/GLOB SERPL: 1.9 {RATIO} (ref 1.2–2.2)
ALP SERPL-CCNC: 102 IU/L (ref 39–117)
ALT SERPL-CCNC: 16 IU/L (ref 0–32)
AST SERPL-CCNC: 15 IU/L (ref 0–40)
BASOPHILS # BLD AUTO: 0.1 X10E3/UL (ref 0–0.2)
BASOPHILS NFR BLD AUTO: 1 %
BILIRUB SERPL-MCNC: 0.8 MG/DL (ref 0–1.2)
BUN SERPL-MCNC: 21 MG/DL (ref 8–27)
BUN/CREAT SERPL: 30 (ref 12–28)
CALCIUM SERPL-MCNC: 9.8 MG/DL (ref 8.7–10.3)
CHLORIDE SERPL-SCNC: 110 MMOL/L (ref 96–106)
CHOLEST SERPL-MCNC: 157 MG/DL (ref 100–199)
CO2 SERPL-SCNC: 19 MMOL/L (ref 20–29)
CREAT SERPL-MCNC: 0.7 MG/DL (ref 0.57–1)
EOSINOPHIL # BLD AUTO: 0.2 X10E3/UL (ref 0–0.4)
EOSINOPHIL NFR BLD AUTO: 3 %
ERYTHROCYTE [DISTWIDTH] IN BLOOD BY AUTOMATED COUNT: 13.1 % (ref 11.7–15.4)
GLOBULIN SER CALC-MCNC: 2.3 G/DL (ref 1.5–4.5)
GLUCOSE SERPL-MCNC: 91 MG/DL (ref 65–99)
HCT VFR BLD AUTO: 49.9 % (ref 34–46.6)
HDLC SERPL-MCNC: 48 MG/DL
HGB BLD-MCNC: 16.9 G/DL (ref 11.1–15.9)
IMM GRANULOCYTES # BLD AUTO: 0 X10E3/UL (ref 0–0.1)
IMM GRANULOCYTES NFR BLD AUTO: 0 %
IMMATURE CELLS  115398: ABNORMAL
LABORATORY COMMENT REPORT: NORMAL
LDLC SERPL CALC-MCNC: 89 MG/DL (ref 0–99)
LYMPHOCYTES # BLD AUTO: 2.6 X10E3/UL (ref 0.7–3.1)
LYMPHOCYTES NFR BLD AUTO: 31 %
MCH RBC QN AUTO: 30.4 PG (ref 26.6–33)
MCHC RBC AUTO-ENTMCNC: 33.9 G/DL (ref 31.5–35.7)
MCV RBC AUTO: 90 FL (ref 79–97)
MONOCYTES # BLD AUTO: 0.6 X10E3/UL (ref 0.1–0.9)
MONOCYTES NFR BLD AUTO: 8 %
MORPHOLOGY BLD-IMP: ABNORMAL
NEUTROPHILS # BLD AUTO: 4.7 X10E3/UL (ref 1.4–7)
NEUTROPHILS NFR BLD AUTO: 57 %
NRBC BLD AUTO-RTO: ABNORMAL %
PLATELET # BLD AUTO: 272 X10E3/UL (ref 150–450)
POTASSIUM SERPL-SCNC: 4.5 MMOL/L (ref 3.5–5.2)
PROT SERPL-MCNC: 6.7 G/DL (ref 6–8.5)
RBC # BLD AUTO: 5.56 X10E6/UL (ref 3.77–5.28)
SARS-COV-2 IGG SERPL QL IA: NEGATIVE
SODIUM SERPL-SCNC: 143 MMOL/L (ref 134–144)
TRIGL SERPL-MCNC: 109 MG/DL (ref 0–149)
TSH SERPL DL<=0.005 MIU/L-ACNC: 1.78 UIU/ML (ref 0.45–4.5)
VLDLC SERPL CALC-MCNC: 20 MG/DL (ref 5–40)
WBC # BLD AUTO: 8.3 X10E3/UL (ref 3.4–10.8)

## 2021-04-16 ENCOUNTER — OFFICE VISIT (OUTPATIENT)
Dept: MEDICAL GROUP | Facility: MEDICAL CENTER | Age: 61
End: 2021-04-16
Payer: COMMERCIAL

## 2021-04-16 VITALS
TEMPERATURE: 98.3 F | BODY MASS INDEX: 24.5 KG/M2 | RESPIRATION RATE: 20 BRPM | HEIGHT: 67 IN | WEIGHT: 156.09 LBS | OXYGEN SATURATION: 95 % | HEART RATE: 81 BPM | SYSTOLIC BLOOD PRESSURE: 130 MMHG | DIASTOLIC BLOOD PRESSURE: 84 MMHG

## 2021-04-16 DIAGNOSIS — Z85.820 H/O MELANOMA EXCISION: ICD-10-CM

## 2021-04-16 DIAGNOSIS — Z12.83 SKIN CANCER SCREENING: ICD-10-CM

## 2021-04-16 DIAGNOSIS — R21 RASH: ICD-10-CM

## 2021-04-16 DIAGNOSIS — Z98.890 H/O MELANOMA EXCISION: ICD-10-CM

## 2021-04-16 PROCEDURE — 99213 OFFICE O/P EST LOW 20 MIN: CPT | Performed by: NURSE PRACTITIONER

## 2021-04-16 RX ORDER — CLOTRIMAZOLE AND BETAMETHASONE DIPROPIONATE 10; .64 MG/G; MG/G
1 CREAM TOPICAL 2 TIMES DAILY
Qty: 45 G | Refills: 0 | Status: SHIPPED | OUTPATIENT
Start: 2021-04-16 | End: 2022-12-25

## 2021-04-16 ASSESSMENT — PATIENT HEALTH QUESTIONNAIRE - PHQ9: CLINICAL INTERPRETATION OF PHQ2 SCORE: 0

## 2021-04-16 ASSESSMENT — FIBROSIS 4 INDEX: FIB4 SCORE: 0.84

## 2021-04-17 NOTE — ASSESSMENT & PLAN NOTE
Excised from the L abdomen many years ago. Was followed by oncology for 10 years. Now has not seen derm for more than a year, needing skin check. No specific concerning lesions at this time

## 2021-04-17 NOTE — PROGRESS NOTES
"Subjective:     Chief Complaint   Patient presents with   • Annual Exam     rash on L-axillary, itches x6m      Kathy Edmonds is a 60 y.o. female here today for evaluation of a small pruritic lesion in the L axilla. Present for several months, itches mostly at night. She has tried topical hydrocortisone and ketoconazole without resolution. No other rash. No change in hygiene products. No vesicles or associated pain    H/O melanoma excision  Excised from the L abdomen many years ago. Was followed by oncology for 10 years. Now has not seen derm for more than a year, needing skin check. No specific concerning lesions at this time       Current medicines (including changes today)  Current Outpatient Medications   Medication Sig Dispense Refill   • clotrimazole-betamethasone (LOTRISONE) 1-0.05 % Cream Apply 1 Application topically 2 times a day. 45 g 0   • ibuprofen (MOTRIN) 200 MG TABS Take 200 mg by mouth every 6 hours as needed.       No current facility-administered medications for this visit.     She  has a past medical history of Cholelithiasis and History of malignant melanoma of skin (age 25). She also has no past medical history of Asthma.    ROS included above     Objective:     /84 (BP Location: Right arm, Patient Position: Sitting, BP Cuff Size: Adult)   Pulse 81   Temp 36.8 °C (98.3 °F) (Temporal)   Resp 20   Ht 1.702 m (5' 7\")   Wt 70.8 kg (156 lb 1.4 oz)   SpO2 95%  Body mass index is 24.45 kg/m².     Physical Exam:  General: Alert, oriented in no acute distress.  Eye contact is good, speech is normal, affect calm  Ext: approx 1 cm annular lesion in the L axilla with mild erythema. No vesicles or drainage. no edema, color normal, vascularity normal, temperature normal    Assessment and Plan:   The following treatment plan was discussed   1. H/O melanoma excision  She is needing to establish with new dermatologist for ongoing skin checks  REFERRAL TO DERMATOLOGY   2. Skin cancer screening  " REFERRAL TO DERMATOLOGY   3. Rash  Small annular erythematous lesion, suspect fungal etiology. Will try:  clotrimazole-betamethasone (LOTRISONE) 1-0.05 % Cream       Followup: as needed         Please note that this dictation was created using voice recognition software. I have worked with consultants from the vendor as well as technical experts from [facility] to optimize the interface. I have made every reasonable attempt to correct obvious errors, but I expect that there are errors of grammar and possibly content that I did not discover before finalizing the note.

## 2022-04-13 ENCOUNTER — OFFICE VISIT (OUTPATIENT)
Dept: URGENT CARE | Facility: CLINIC | Age: 62
End: 2022-04-13
Payer: COMMERCIAL

## 2022-04-13 VITALS
WEIGHT: 160 LBS | BODY MASS INDEX: 25.11 KG/M2 | HEART RATE: 85 BPM | SYSTOLIC BLOOD PRESSURE: 108 MMHG | RESPIRATION RATE: 16 BRPM | DIASTOLIC BLOOD PRESSURE: 62 MMHG | OXYGEN SATURATION: 98 % | HEIGHT: 67 IN | TEMPERATURE: 97.5 F

## 2022-04-13 DIAGNOSIS — J01.90 ACUTE NON-RECURRENT SINUSITIS, UNSPECIFIED LOCATION: ICD-10-CM

## 2022-04-13 DIAGNOSIS — Z87.09 HISTORY OF SINUSITIS: ICD-10-CM

## 2022-04-13 PROCEDURE — 99214 OFFICE O/P EST MOD 30 MIN: CPT | Performed by: PHYSICIAN ASSISTANT

## 2022-04-13 RX ORDER — BENZONATATE 200 MG/1
200 CAPSULE ORAL 3 TIMES DAILY PRN
Qty: 60 CAPSULE | Refills: 0 | Status: SHIPPED | OUTPATIENT
Start: 2022-04-13 | End: 2022-04-21

## 2022-04-13 RX ORDER — AMOXICILLIN 500 MG/1
500 CAPSULE ORAL 3 TIMES DAILY
Qty: 21 CAPSULE | Refills: 0 | Status: SHIPPED | OUTPATIENT
Start: 2022-04-13 | End: 2022-04-20

## 2022-04-13 ASSESSMENT — ENCOUNTER SYMPTOMS
FEVER: 0
MYALGIAS: 0
SHORTNESS OF BREATH: 0
RHINORRHEA: 1
WHEEZING: 0
COUGH: 1

## 2022-04-13 ASSESSMENT — FIBROSIS 4 INDEX: FIB4 SCORE: 0.85

## 2022-04-13 ASSESSMENT — COPD QUESTIONNAIRES: COPD: 0

## 2022-04-13 NOTE — PROGRESS NOTES
Subjective:   Kathy Edmonds is a 61 y.o. female who presents for Cough (Notes history of bronchitis ) and Sinus Problem (X1 week, worse at night )        Has been travelling to [place] frequently for competitions.    Cough  This is a new problem. The current episode started 1 to 4 weeks ago. The problem has been gradually worsening. The problem occurs constantly. The cough is productive of sputum. Associated symptoms include nasal congestion, postnasal drip and rhinorrhea. Pertinent negatives include no chest pain, ear congestion, ear pain, fever, myalgias, shortness of breath or wheezing. The symptoms are aggravated by lying down. She has tried nothing for the symptoms. Her past medical history is significant for bronchitis (as a youth). There is no history of asthma or COPD.     Review of Systems   Constitutional: Negative for fever.   HENT: Positive for postnasal drip and rhinorrhea. Negative for ear pain.    Respiratory: Positive for cough. Negative for shortness of breath and wheezing.    Cardiovascular: Negative for chest pain.   Musculoskeletal: Negative for myalgias.       PMH:  has a past medical history of Cholelithiasis and History of malignant melanoma of skin (age 25).    She has no past medical history of Asthma.  MEDS:   Current Outpatient Medications:   •  amoxicillin (AMOXIL) 500 MG Cap, Take 1 Capsule by mouth 3 times a day for 7 days., Disp: 21 Capsule, Rfl: 0  •  benzonatate (TESSALON) 200 MG capsule, Take 1 Capsule by mouth 3 times a day as needed., Disp: 60 Capsule, Rfl: 0  •  clotrimazole-betamethasone (LOTRISONE) 1-0.05 % Cream, Apply 1 Application topically 2 times a day., Disp: 45 g, Rfl: 0  •  ibuprofen (MOTRIN) 200 MG TABS, Take 200 mg by mouth every 6 hours as needed., Disp: , Rfl:   ALLERGIES: No Known Allergies  SURGHX:   Past Surgical History:   Procedure Laterality Date   • CHOLECYSTECTOMY     • OTHER      excision of melanoma left lateral rib cage area     SOCHX:  reports that she quit  "smoking about 7 years ago. Her smoking use included cigarettes. She has a 35.00 pack-year smoking history. She has never used smokeless tobacco. She reports current alcohol use. She reports that she does not use drugs.  FH: Family history was reviewed, no pertinent findings to report   Objective:   /62   Pulse 85   Temp 36.4 °C (97.5 °F) (Temporal)   Resp 16   Ht 1.702 m (5' 7\")   Wt 72.6 kg (160 lb)   LMP  (Exact Date)   SpO2 98%   Breastfeeding No   BMI 25.06 kg/m²   Physical Exam  Vitals reviewed.   Constitutional:       General: She is not in acute distress.     Appearance: Normal appearance. She is well-developed. She is not toxic-appearing.   HENT:      Head: Normocephalic and atraumatic.      Right Ear: External ear normal.      Left Ear: External ear normal.      Nose: Congestion and rhinorrhea present. Rhinorrhea is clear.      Mouth/Throat:      Lips: Pink.      Mouth: Mucous membranes are moist.      Pharynx: Oropharynx is clear. Uvula midline. Posterior oropharyngeal erythema present.   Cardiovascular:      Rate and Rhythm: Normal rate and regular rhythm.      Heart sounds: Normal heart sounds, S1 normal and S2 normal.   Pulmonary:      Effort: Pulmonary effort is normal. No respiratory distress.      Breath sounds: Normal breath sounds. No stridor. No decreased breath sounds, wheezing, rhonchi or rales.      Comments: Frequent harsh, dry cough  Skin:     General: Skin is dry.   Neurological:      Comments: Alert and oriented.    Psychiatric:         Speech: Speech normal.         Behavior: Behavior normal.           Assessment/Plan:   1. Acute non-recurrent sinusitis, unspecified location  - amoxicillin (AMOXIL) 500 MG Cap; Take 1 Capsule by mouth 3 times a day for 7 days.  Dispense: 21 Capsule; Refill: 0  - benzonatate (TESSALON) 200 MG capsule; Take 1 Capsule by mouth 3 times a day as needed.  Dispense: 60 Capsule; Refill: 0    2. History of sinusitis    No evidence of lower respiratory " infection on exam today.  Clinical suspicion for pneumonia low at this time.  Patient has had persistent and worsening upper respiratory congestion and sinus pressure.  I suspect cough is related to postnasal drip.  At this point I am not convinced an antibiotic will be beneficial but patient is adamant that symptoms consistent with prior sinus infections and historically amoxicillin has worked well for her.  She does agree to treat with antihistamine and antitussives for the next 3 to 5 days.  States that she does not tolerate nasal sprays.  If she continues to improve she should continue these medications.  If symptoms fail to improve within this timeframe or symptoms worsen she may begin antibiotic as prescribed.  Contingent prescription given.    If patient develops new symptoms or symptoms fail to improve despite this follow-up with PCP or return to clinic for reevaluation.    Differential diagnosis, natural history, supportive care, and indications for immediate follow-up discussed.

## 2022-04-21 ENCOUNTER — OFFICE VISIT (OUTPATIENT)
Dept: URGENT CARE | Facility: CLINIC | Age: 62
End: 2022-04-21
Payer: COMMERCIAL

## 2022-04-21 VITALS
SYSTOLIC BLOOD PRESSURE: 122 MMHG | HEART RATE: 83 BPM | RESPIRATION RATE: 20 BRPM | TEMPERATURE: 98 F | DIASTOLIC BLOOD PRESSURE: 82 MMHG | OXYGEN SATURATION: 94 % | HEIGHT: 67 IN | WEIGHT: 150 LBS | BODY MASS INDEX: 23.54 KG/M2

## 2022-04-21 DIAGNOSIS — J32.9 RECURRENT SINUSITIS: ICD-10-CM

## 2022-04-21 DIAGNOSIS — R05.9 COUGH: ICD-10-CM

## 2022-04-21 PROCEDURE — 99213 OFFICE O/P EST LOW 20 MIN: CPT | Performed by: PHYSICIAN ASSISTANT

## 2022-04-21 RX ORDER — DOXYCYCLINE HYCLATE 100 MG
100 TABLET ORAL 2 TIMES DAILY
Qty: 14 TABLET | Refills: 0 | Status: SHIPPED | OUTPATIENT
Start: 2022-04-21 | End: 2022-04-28

## 2022-04-21 RX ORDER — DEXTROMETHORPHAN HYDROBROMIDE AND PROMETHAZINE HYDROCHLORIDE 15; 6.25 MG/5ML; MG/5ML
5 SYRUP ORAL EVERY 6 HOURS PRN
Qty: 120 ML | Refills: 0 | Status: SHIPPED | OUTPATIENT
Start: 2022-04-21 | End: 2022-04-27

## 2022-04-21 ASSESSMENT — FIBROSIS 4 INDEX: FIB4 SCORE: 0.85

## 2022-04-21 ASSESSMENT — ENCOUNTER SYMPTOMS
SINUS PRESSURE: 1
COUGH: 1

## 2022-04-21 NOTE — PROGRESS NOTES
Subjective:   Kathy Edmonds is a 61 y.o. female who presents today with   Chief Complaint   Patient presents with   • Sinus Problem     X 9 days, sinus congestion, cough, worst at night, chest congestion.     Sinus Problem  This is a new problem. Episode onset: 9 days. The problem has been gradually worsening since onset. There has been no fever. The pain is moderate. Associated symptoms include congestion, coughing and sinus pressure. Treatments tried: tessalon. The treatment provided no relief.   Patient was seen on April 13 and prescribed amoxicillin and Tessalon Perles which she states did not help.  She states she stopped taking amoxicillin after 4 days as it was not improving her symptoms.  She states she has a history of sinus infections.  Cough is gotten worse at night and keeps her from sleeping.  PMH:  has a past medical history of Cholelithiasis and History of malignant melanoma of skin (age 25).    She has no past medical history of Asthma.  MEDS:   Current Outpatient Medications:   •  promethazine-dextromethorphan (PROMETHAZINE-DM) 6.25-15 MG/5ML syrup, Take 5 mL by mouth every 6 hours as needed for Cough for up to 6 days., Disp: 120 mL, Rfl: 0  •  doxycycline (VIBRAMYCIN) 100 MG Tab, Take 1 Tablet by mouth 2 times a day for 7 days., Disp: 14 Tablet, Rfl: 0  •  clotrimazole-betamethasone (LOTRISONE) 1-0.05 % Cream, Apply 1 Application topically 2 times a day., Disp: 45 g, Rfl: 0  ALLERGIES: No Known Allergies  SURGHX:   Past Surgical History:   Procedure Laterality Date   • CHOLECYSTECTOMY     • OTHER      excision of melanoma left lateral rib cage area     SOCHX:  reports that she quit smoking about 7 years ago. Her smoking use included cigarettes. She has a 35.00 pack-year smoking history. She has never used smokeless tobacco. She reports previous alcohol use. She reports that she does not use drugs.  FH: Reviewed with patient, not pertinent to this visit.     Review of Systems   HENT: Positive for  "congestion and sinus pressure.    Respiratory: Positive for cough.       Objective:   /82   Pulse 83   Temp 36.7 °C (98 °F)   Resp 20   Ht 1.702 m (5' 7\")   Wt 68 kg (150 lb)   SpO2 94%   Breastfeeding No   BMI 23.49 kg/m²   Physical Exam  Vitals and nursing note reviewed.   Constitutional:       General: She is not in acute distress.     Appearance: Normal appearance. She is well-developed. She is not ill-appearing or toxic-appearing.   HENT:      Head: Normocephalic and atraumatic.      Right Ear: Hearing normal.      Left Ear: Hearing normal.      Nose: Mucosal edema and congestion present.      Right Sinus: Maxillary sinus tenderness present.      Left Sinus: Maxillary sinus tenderness present.   Eyes:      Conjunctiva/sclera: Conjunctivae normal.   Cardiovascular:      Rate and Rhythm: Normal rate and regular rhythm.      Heart sounds: Normal heart sounds.   Pulmonary:      Effort: Pulmonary effort is normal.      Breath sounds: Normal breath sounds. No stridor. No wheezing, rhonchi or rales.   Musculoskeletal:      Comments: Normal movement in all 4 extremities   Skin:     General: Skin is warm and dry.   Neurological:      Mental Status: She is alert.      Coordination: Coordination normal.   Psychiatric:         Mood and Affect: Mood normal.       Assessment/Plan:   Assessment    1. Recurrent sinusitis  - doxycycline (VIBRAMYCIN) 100 MG Tab; Take 1 Tablet by mouth 2 times a day for 7 days.  Dispense: 14 Tablet; Refill: 0    2. Cough  - promethazine-dextromethorphan (PROMETHAZINE-DM) 6.25-15 MG/5ML syrup; Take 5 mL by mouth every 6 hours as needed for Cough for up to 6 days.  Dispense: 120 mL; Refill: 0  Symptoms do appear to be consistent with sinus infection and will treat accordingly with antibiotics.  Extensively discussed with patient my recommendation to fully finish the antibiotic even if her symptoms do not seem to be improving.  Also discussed possible side effects of cough syrup with " patient and she is fully understanding.  Recommend only using cough syrup sparingly as prescribed and not before driving or working and mainly at night.  Differential diagnosis, natural history, supportive care, and indications for immediate follow-up discussed.   Patient given instructions and understanding of medications and treatment.    If not improving in 3-5 days, F/U with PCP or return to UC if symptoms worsen.    Patient agreeable to plan.      Please note that this dictation was created using voice recognition software. I have made every reasonable attempt to correct obvious errors, but I expect that there are errors of grammar and possibly content that I did not discover before finalizing the note.    Israel Hawley PA-C

## 2022-11-19 ENCOUNTER — OFFICE VISIT (OUTPATIENT)
Dept: URGENT CARE | Facility: CLINIC | Age: 62
End: 2022-11-19

## 2022-11-19 VITALS
SYSTOLIC BLOOD PRESSURE: 122 MMHG | RESPIRATION RATE: 18 BRPM | TEMPERATURE: 98.7 F | DIASTOLIC BLOOD PRESSURE: 78 MMHG | HEIGHT: 67 IN | HEART RATE: 80 BPM | OXYGEN SATURATION: 95 % | BODY MASS INDEX: 22.76 KG/M2 | WEIGHT: 145 LBS

## 2022-11-19 DIAGNOSIS — Z87.09 HX OF SINUSITIS: ICD-10-CM

## 2022-11-19 DIAGNOSIS — J32.9 RHINOSINUSITIS: ICD-10-CM

## 2022-11-19 DIAGNOSIS — R05.1 ACUTE COUGH: ICD-10-CM

## 2022-11-19 PROCEDURE — 99213 OFFICE O/P EST LOW 20 MIN: CPT

## 2022-11-19 RX ORDER — PREDNISONE 20 MG/1
40 TABLET ORAL DAILY
Qty: 10 TABLET | Refills: 0 | Status: SHIPPED | OUTPATIENT
Start: 2022-11-19 | End: 2022-11-24

## 2022-11-19 RX ORDER — AMOXICILLIN 500 MG/1
500 CAPSULE ORAL 3 TIMES DAILY
Qty: 21 CAPSULE | Refills: 0 | Status: SHIPPED | OUTPATIENT
Start: 2022-11-19 | End: 2022-11-26

## 2022-11-19 ASSESSMENT — ENCOUNTER SYMPTOMS
SHORTNESS OF BREATH: 0
FEVER: 0
WEIGHT LOSS: 0
DIAPHORESIS: 0
SORE THROAT: 1
WHEEZING: 1
COUGH: 1
HEMOPTYSIS: 0
SINUS PAIN: 1
SPUTUM PRODUCTION: 1
CHILLS: 0
STRIDOR: 0

## 2022-11-19 ASSESSMENT — FIBROSIS 4 INDEX: FIB4 SCORE: 0.87

## 2022-11-19 NOTE — PROGRESS NOTES
Subjective:   Kathy Edmonds is a 62 y.o. female who presents for Cough (X 6 days, sore throat, cough, chest congestion.)      HPI:  This is a 62-year-old female patient who presents today for acute sinus problem.  Patient reports associated nasal congestion, facial pressure, postnasal drip, cough x6 days.  Patient does report history of recurrent sinusitis.  She reports symptoms have worsened, particularly her cough which is worse at night.  Reports cough is productive.  She has taken 4 amoxicillin given to her by a friend.  She does report 45-year history of cigarette smoking without COPD, she quit smoking in 2014.  She also reports associated wheezing.  No fevers, chills, body aches.  She denies sick contacts.      Review of Systems   Constitutional:  Negative for chills, diaphoresis, fever, malaise/fatigue and weight loss.   HENT:  Positive for congestion, sinus pain and sore throat. Negative for ear discharge and ear pain.         Positive for postnasal drip   Respiratory:  Positive for cough, sputum production and wheezing. Negative for hemoptysis, shortness of breath and stridor.    All other systems reviewed and are negative.    Medications:    Current Outpatient Medications on File Prior to Visit   Medication Sig Dispense Refill    clotrimazole-betamethasone (LOTRISONE) 1-0.05 % Cream Apply 1 Application topically 2 times a day. (Patient not taking: Reported on 11/19/2022) 45 g 0     No current facility-administered medications on file prior to visit.        Allergies:   Patient has no known allergies.    Problem List:   Patient Active Problem List   Diagnosis    H/O melanoma excision    Muscle strain        Surgical History:  Past Surgical History:   Procedure Laterality Date    CHOLECYSTECTOMY      OTHER      excision of melanoma left lateral rib cage area       Past Social Hx:   Social History     Tobacco Use    Smoking status: Former     Packs/day: 1.00     Years: 35.00     Pack years: 35.00     Types:  "Cigarettes     Quit date: 2014     Years since quittin.5    Smokeless tobacco: Never   Vaping Use    Vaping Use: Never used   Substance Use Topics    Alcohol use: Not Currently     Comment: rare    Drug use: No          Problem list, medications, and allergies reviewed by myself today in Epic.     Objective:     /78   Pulse 80   Temp 37.1 °C (98.7 °F) (Temporal)   Resp 18   Ht 1.702 m (5' 7\")   Wt 65.8 kg (145 lb)   SpO2 95%   BMI 22.71 kg/m²     Physical Exam  Vitals and nursing note reviewed.   Constitutional:       General: She is awake. She is not in acute distress.     Appearance: Normal appearance. She is well-developed and normal weight. She is not ill-appearing, toxic-appearing or diaphoretic.   HENT:      Head: Normocephalic and atraumatic.      Right Ear: Tympanic membrane, ear canal and external ear normal. There is no impacted cerumen.      Left Ear: Tympanic membrane, ear canal and external ear normal. There is no impacted cerumen.      Nose: Congestion present.      Mouth/Throat:      Mouth: Mucous membranes are moist.      Pharynx: Oropharynx is clear. Posterior oropharyngeal erythema present. No oropharyngeal exudate.   Cardiovascular:      Rate and Rhythm: Normal rate and regular rhythm.      Pulses: Normal pulses.      Heart sounds: Normal heart sounds. No murmur heard.    No friction rub. No gallop.   Pulmonary:      Effort: Pulmonary effort is normal. No respiratory distress.      Breath sounds: Normal breath sounds. No stridor. No wheezing, rhonchi or rales.   Chest:      Chest wall: No tenderness.   Musculoskeletal:      Cervical back: Neck supple. No tenderness.   Lymphadenopathy:      Cervical: No cervical adenopathy.   Skin:     General: Skin is warm and dry.      Capillary Refill: Capillary refill takes less than 2 seconds.   Neurological:      General: No focal deficit present.      Mental Status: She is alert and oriented to person, place, and time. Mental status is " at baseline.      Cranial Nerves: No cranial nerve deficit.      Motor: No weakness.      Gait: Gait normal.   Psychiatric:         Mood and Affect: Mood normal.         Behavior: Behavior normal. Behavior is cooperative.         Thought Content: Thought content normal.         Judgment: Judgment normal.       Assessment/Plan:     Diagnosis and associated orders:     1. Rhinosinusitis  amoxicillin (AMOXIL) 500 MG Cap    predniSONE (DELTASONE) 20 MG Tab      2. Acute cough  amoxicillin (AMOXIL) 500 MG Cap    predniSONE (DELTASONE) 20 MG Tab      3. Hx of sinusitis            Comments/MDM:   Pt is clinically stable at today's acute urgent care visit.  No acute distress noted. Appropriate for outpatient management at this time.     Patient is not ill or toxic appearing in clinic today, vital signs are stable, she is afebrile I personally reviewed patient's previous encounters which illustrate long history of sinusitis and cough.  Physical exam and symptoms consistent with this.  Patient will be treated with amoxicillin 500 mg 3 times daily x7 days and given prescription for prednisone.  I advised patient to begin taking medications as prescribed, use over-the-counter antihistamine, and use over-the-counter saline nasal irrigations.  She is to return to  or go to ER for any new or worsening signs or symptoms and follow-up with PCP for recheck.  Patient is agreeable plan of care and verbalizes good understanding today         Discussed DDx, management options (risks,benefits, and alternatives to planned treatment), natural progression and supportive care.  Expressed understanding and the treatment plan was agreed upon. Questions were encouraged and answered   Return to urgent care prn if new or worsening sx or if there is no improvement in condition prn.    Educated in Red flags and indications to immediately call 911 or present to the Emergency Department.   Advised the patient to follow-up with the primary care  physician for recheck, reevaluation, and consideration of further management.    I personally reviewed prior external notes and test results pertinent to today's visit.  I have independently reviewed and interpreted all diagnostics ordered during this urgent care acute visit.       Please note that this dictation was created using voice recognition software. I have made a reasonable attempt to correct obvious errors, but I expect that there are errors of grammar and possibly content that I did not discover before finalizing the note.    This note was electronically signed by KELIN Bethea

## 2022-12-25 ENCOUNTER — OFFICE VISIT (OUTPATIENT)
Dept: URGENT CARE | Facility: CLINIC | Age: 62
End: 2022-12-25

## 2022-12-25 VITALS
TEMPERATURE: 97.9 F | DIASTOLIC BLOOD PRESSURE: 84 MMHG | SYSTOLIC BLOOD PRESSURE: 122 MMHG | RESPIRATION RATE: 18 BRPM | BODY MASS INDEX: 23.54 KG/M2 | HEIGHT: 67 IN | OXYGEN SATURATION: 94 % | HEART RATE: 80 BPM | WEIGHT: 150 LBS

## 2022-12-25 DIAGNOSIS — J01.11 ACUTE RECURRENT FRONTAL SINUSITIS: ICD-10-CM

## 2022-12-25 DIAGNOSIS — R09.82 PND (POST-NASAL DRIP): ICD-10-CM

## 2022-12-25 DIAGNOSIS — R05.9 COUGH, UNSPECIFIED TYPE: ICD-10-CM

## 2022-12-25 PROCEDURE — 99213 OFFICE O/P EST LOW 20 MIN: CPT | Performed by: FAMILY MEDICINE

## 2022-12-25 RX ORDER — AMOXICILLIN 500 MG/1
500 CAPSULE ORAL 2 TIMES DAILY
Qty: 20 CAPSULE | Refills: 0 | Status: SHIPPED | OUTPATIENT
Start: 2022-12-25 | End: 2022-12-29

## 2022-12-25 RX ORDER — DEXTROMETHORPHAN HYDROBROMIDE AND PROMETHAZINE HYDROCHLORIDE 15; 6.25 MG/5ML; MG/5ML
5 SYRUP ORAL EVERY 6 HOURS PRN
Qty: 120 ML | Refills: 0 | Status: SHIPPED | OUTPATIENT
Start: 2022-12-25 | End: 2022-12-29

## 2022-12-25 ASSESSMENT — FIBROSIS 4 INDEX: FIB4 SCORE: 0.87

## 2022-12-25 ASSESSMENT — ENCOUNTER SYMPTOMS
SINUS PAIN: 1
COUGH: 1
SORE THROAT: 1
CHILLS: 1

## 2022-12-25 NOTE — PROGRESS NOTES
"Subjective     Kathy Edmonds is a 62 y.o. female who presents with Pharyngitis (X1week, Nasal drainage, cough, concern for a sinus infection, states two nights ago had chills, )      - This is a pleasant and nontoxic appearing 62 y.o. female who has come to the walk-in clinic today for:    #1) 1-2 wks sinus congestion pain pressure w/ pnd and cough. No associated NVFC/cp/sob. Hx recurrent sinus infections and feels same       ALLERGIES:  Patient has no known allergies.     PMH:  Past Medical History:   Diagnosis Date    Cholelithiasis     History of malignant melanoma of skin age 25        PSH:  Past Surgical History:   Procedure Laterality Date    CHOLECYSTECTOMY      OTHER      excision of melanoma left lateral rib cage area       MEDS:    Current Outpatient Medications:     amoxicillin (AMOXIL) 500 MG Cap, Take 1 Capsule by mouth 3 times a day for 7 days., Disp: 21 Capsule, Rfl: 0    guaifenesin-codeine (ROBITUSSIN AC) Solution oral solution, Take 10 mL by mouth every 6 hours as needed for Cough for up to 7 days., Disp: 180 mL, Rfl: 0    predniSONE (DELTASONE) 20 MG Tab, Take 2 Tablets by mouth every morning for 5 days., Disp: 10 Tablet, Rfl: 0    ** I have documented what I find to be significant in regards to past medical, social, family and surgical history  in my HPI or under PMH/PSH/FH review section, otherwise it is noncontributory **         HPI    Review of Systems   Constitutional:  Positive for chills.   HENT:  Positive for congestion, sinus pain and sore throat.    Respiratory:  Positive for cough.    All other systems reviewed and are negative.           Objective     /84   Pulse 80   Temp 36.6 °C (97.9 °F) (Temporal)   Resp 18   Ht 1.702 m (5' 7\")   Wt 68 kg (150 lb)   SpO2 94%   BMI 23.49 kg/m²      Physical Exam  Vitals and nursing note reviewed.   Constitutional:       General: She is not in acute distress.     Appearance: Normal appearance. She is well-developed.   HENT:      Head: " Normocephalic.      Nose: Congestion and rhinorrhea present.      Mouth/Throat:      Mouth: Mucous membranes are moist.      Pharynx: Oropharynx is clear. Posterior oropharyngeal erythema present. No oropharyngeal exudate.   Cardiovascular:      Heart sounds: Normal heart sounds. No murmur heard.  Pulmonary:      Effort: Pulmonary effort is normal. No respiratory distress.      Breath sounds: Normal breath sounds. No rhonchi or rales.   Neurological:      Mental Status: She is alert.      Motor: No abnormal muscle tone.   Psychiatric:         Mood and Affect: Mood normal.         Behavior: Behavior normal.                           Assessment & Plan       1. Acute recurrent frontal sinusitis  amoxicillin (AMOXIL) 500 MG Cap    DISCONTINUED: amoxicillin (AMOXIL) 500 MG Cap      2. PND (post-nasal drip)        3. Cough, unspecified type  guaifenesin-codeine (ROBITUSSIN AC) Solution oral solution    predniSONE (DELTASONE) 20 MG Tab    DISCONTINUED: promethazine-dextromethorphan (PROMETHAZINE-DM) 6.25-15 MG/5ML syrup          - Dx, plan & d/c instructions discussed   - Rest, stay hydrated, OTC Motrin and/or Tylenol as needed      Follow up with your regular primary care providers office for a recheck on today's visit. ER if not improving in 2-3 days or if feeling/getting worse.    Any realistic side effects of medications that may have been given today reviewed.     Patient left in stable condition

## 2022-12-28 ENCOUNTER — TELEPHONE (OUTPATIENT)
Dept: URGENT CARE | Facility: CLINIC | Age: 62
End: 2022-12-28

## 2022-12-28 NOTE — TELEPHONE ENCOUNTER
Patient showed up in our clinic stating that Amox 500 MG Cap were discussed 3 times per day(10 more), instead of 2/day. Prednisone was not prescribed either as discussed on lv:12/25/22.  PT also stated that her Promethazine-dextromethorphan was not working at all:pt requesting to switch it to Codeine cough syrup since it works for her all the time. Because the original prescribed did not do anything for her.

## 2022-12-29 RX ORDER — CODEINE PHOSPHATE AND GUAIFENESIN 10; 100 MG/5ML; MG/5ML
10 SOLUTION ORAL EVERY 6 HOURS PRN
Qty: 180 ML | Refills: 0 | Status: SHIPPED | OUTPATIENT
Start: 2022-12-29 | End: 2023-01-05

## 2022-12-29 RX ORDER — AMOXICILLIN 500 MG/1
500 CAPSULE ORAL 3 TIMES DAILY
Qty: 21 CAPSULE | Refills: 0 | Status: SHIPPED | OUTPATIENT
Start: 2022-12-29 | End: 2023-01-05

## 2022-12-29 RX ORDER — PREDNISONE 20 MG/1
40 TABLET ORAL EVERY MORNING
Qty: 10 TABLET | Refills: 0 | Status: SHIPPED | OUTPATIENT
Start: 2022-12-29 | End: 2023-01-03

## 2023-01-07 ENCOUNTER — OFFICE VISIT (OUTPATIENT)
Dept: URGENT CARE | Facility: CLINIC | Age: 63
End: 2023-01-07

## 2023-01-07 VITALS
OXYGEN SATURATION: 95 % | HEIGHT: 67 IN | RESPIRATION RATE: 16 BRPM | DIASTOLIC BLOOD PRESSURE: 60 MMHG | SYSTOLIC BLOOD PRESSURE: 106 MMHG | TEMPERATURE: 98.2 F | BODY MASS INDEX: 23.54 KG/M2 | WEIGHT: 150 LBS | HEART RATE: 80 BPM

## 2023-01-07 DIAGNOSIS — J01.90 ACUTE BACTERIAL SINUSITIS: ICD-10-CM

## 2023-01-07 DIAGNOSIS — B96.89 ACUTE BACTERIAL SINUSITIS: ICD-10-CM

## 2023-01-07 DIAGNOSIS — R05.2 SUBACUTE COUGH: ICD-10-CM

## 2023-01-07 PROCEDURE — 99213 OFFICE O/P EST LOW 20 MIN: CPT | Performed by: PHYSICIAN ASSISTANT

## 2023-01-07 RX ORDER — METHOCARBAMOL 500 MG/1
500 TABLET, FILM COATED ORAL 3 TIMES DAILY PRN
Qty: 15 TABLET | Refills: 0 | Status: SHIPPED | OUTPATIENT
Start: 2023-01-07 | End: 2023-01-12

## 2023-01-07 RX ORDER — CEFDINIR 300 MG/1
300 CAPSULE ORAL 2 TIMES DAILY
Qty: 20 CAPSULE | Refills: 0 | Status: SHIPPED | OUTPATIENT
Start: 2023-01-07 | End: 2023-01-15

## 2023-01-07 RX ORDER — BENZONATATE 100 MG/1
100 CAPSULE ORAL 3 TIMES DAILY PRN
Qty: 30 CAPSULE | Refills: 0 | Status: SHIPPED | OUTPATIENT
Start: 2023-01-07 | End: 2023-01-15

## 2023-01-07 ASSESSMENT — ENCOUNTER SYMPTOMS
ABDOMINAL PAIN: 0
SHORTNESS OF BREATH: 0
NAUSEA: 0
EYE PAIN: 0
SINUS PAIN: 1
HEADACHES: 0
CONSTIPATION: 0
COUGH: 1
MYALGIAS: 0
SORE THROAT: 1
DIARRHEA: 0
CHILLS: 0
FEVER: 0
VOMITING: 0

## 2023-01-07 ASSESSMENT — FIBROSIS 4 INDEX: FIB4 SCORE: 0.87

## 2023-01-07 NOTE — PROGRESS NOTES
"Subjective:   Kathy Edmonds is a 62 y.o. female who presents for Cough (Worse at night, continued since previous visit. Sore throat)      62-year-old female notes ongoing cough and congestion following urgent care visit in December.  She was prescribed a promethazine with dextromethorphan cough medicine as well as 5 days of prednisone and a 10-day course of amoxicillin twice daily.  She noted improvement while on antibiotics but has had a insidious worsening in the intervening time.  She notes sinus pain and pressure, generalized malaise, anterior headache.  She notes a cough and sore throat worse when she is supine.  Has been taking antihistamine daily and Mucinex.  She is averse to any nasal sprays    Review of Systems   Constitutional:  Positive for malaise/fatigue. Negative for chills and fever.   HENT:  Positive for congestion, sinus pain and sore throat. Negative for ear pain.    Eyes:  Negative for pain.   Respiratory:  Positive for cough. Negative for shortness of breath.    Cardiovascular:  Negative for chest pain.   Gastrointestinal:  Negative for abdominal pain, constipation, diarrhea, nausea and vomiting.   Genitourinary:  Negative for dysuria.   Musculoskeletal:  Negative for myalgias.   Skin:  Negative for rash.   Neurological:  Negative for headaches.     Medications, Allergies, and current problem list reviewed today in Epic.     Objective:     /60   Pulse 80   Temp 36.8 °C (98.2 °F) (Temporal)   Resp 16   Ht 1.702 m (5' 7\")   Wt 68 kg (150 lb)   SpO2 95%     Physical Exam  Vitals reviewed.   Constitutional:       Appearance: Normal appearance. She is not toxic-appearing.   HENT:      Head: Normocephalic and atraumatic.      Right Ear: Tympanic membrane, ear canal and external ear normal.      Left Ear: Tympanic membrane, ear canal and external ear normal.      Nose: Rhinorrhea present.      Mouth/Throat:      Mouth: Mucous membranes are moist.      Comments: Cobblestoning of posterior " oropharynx  Eyes:      Conjunctiva/sclera: Conjunctivae normal.      Pupils: Pupils are equal, round, and reactive to light.   Cardiovascular:      Rate and Rhythm: Normal rate and regular rhythm.   Pulmonary:      Effort: Pulmonary effort is normal.      Breath sounds: Normal breath sounds.   Musculoskeletal:      Cervical back: Normal range of motion.   Lymphadenopathy:      Cervical: No cervical adenopathy.   Skin:     General: Skin is warm and dry.      Capillary Refill: Capillary refill takes less than 2 seconds.   Neurological:      Mental Status: She is alert and oriented to person, place, and time.       Assessment/Plan:     Diagnosis and associated orders:     1. Acute bacterial sinusitis  cefdinir (OMNICEF) 300 MG Cap    benzonatate (TESSALON) 100 MG Cap      2. Subacute cough  methocarbamol (ROBAXIN) 500 MG Tab         Comments/MDM:     Will trial cephalosporin since she only noted modest improvement with the amoxicillin.  Trial of Tessalon as well.  She is noticing a lot of chest wall irritation due to aggressive coughing fits as well as diaphragmatic irritation.  We will trial Robaxin, caution patient's may cause some sedation.  I strongly recommended she consider a trial of nasal glucocorticoid spray as I think that her sinusitis and postnasal drip are likely the cause of her persistent cough and irritation.  Continue antihistamine.  May trial antihistamine in morning and diphenhydramine at night.  Also recommended benzocaine lozenges         Differential diagnosis, natural history, supportive care, and indications for immediate follow-up discussed.    Advised the patient to follow-up with the primary care physician for recheck, reevaluation, and consideration of further management.    Please note that this dictation was created using voice recognition software. I have made a reasonable attempt to correct obvious errors, but I expect that there are errors of grammar and possibly content that I did not  discover before finalizing the note.    This note was electronically signed by Yamil Arroyo PA-C

## 2023-01-07 NOTE — PROGRESS NOTES
"Subjective:   Kathy Edmonds is a 62 y.o. female who presents for Cough (Worse at night, continued since previous visit. Sore throat)      62-year-old female notes a cough, worse at night, has a mild sore throat usually in the mornings upon awakening.  Has not been taking over-the-counter meds.  Feels improved from last visit but still having a lingering cough.    Review of Systems   Constitutional:  Negative for chills and fever.   HENT:  Positive for congestion and sore throat. Negative for ear pain.    Eyes:  Negative for pain.   Respiratory:  Positive for cough. Negative for shortness of breath.    Cardiovascular:  Negative for chest pain.   Gastrointestinal:  Negative for abdominal pain, constipation, diarrhea, nausea and vomiting.   Genitourinary:  Negative for dysuria.   Musculoskeletal:  Negative for myalgias.   Skin:  Negative for rash.   Neurological:  Negative for headaches.     Medications, Allergies, and current problem list reviewed today in Epic.     Objective:     /60   Pulse 80   Temp 36.8 °C (98.2 °F) (Temporal)   Resp 16   Ht 1.702 m (5' 7\")   Wt 68 kg (150 lb)   SpO2 95%     Physical Exam  Vitals reviewed.   Constitutional:       Appearance: Normal appearance.   HENT:      Head: Normocephalic and atraumatic.      Right Ear: Tympanic membrane, ear canal and external ear normal.      Left Ear: Tympanic membrane, ear canal and external ear normal.      Nose: Rhinorrhea present.      Mouth/Throat:      Mouth: Mucous membranes are moist.      Pharynx: No oropharyngeal exudate or posterior oropharyngeal erythema.      Comments: POST NASAL DRIP  Eyes:      Conjunctiva/sclera: Conjunctivae normal.   Cardiovascular:      Rate and Rhythm: Normal rate and regular rhythm.   Pulmonary:      Effort: Pulmonary effort is normal.      Breath sounds: Normal breath sounds.   Musculoskeletal:      Cervical back: Normal range of motion.   Lymphadenopathy:      Cervical: No cervical adenopathy.   Skin:     " General: Skin is warm and dry.      Capillary Refill: Capillary refill takes less than 2 seconds.   Neurological:      Mental Status: She is alert and oriented to person, place, and time.       Assessment/Plan:     Diagnosis and associated orders:     1. PND (post-nasal drip)           Comments/MDM:     Discussed antihistamine, nasal steroids, humidifier in bedroom.  Patient demonstrates some mild post viral congestion and cough that is symptomatically improving.  No indication for antibiotic therapy at this point.  Discussed follow-up         Differential diagnosis, natural history, supportive care, and indications for immediate follow-up discussed.    Advised the patient to follow-up with the primary care physician for recheck, reevaluation, and consideration of further management.    Please note that this dictation was created using voice recognition software. I have made a reasonable attempt to correct obvious errors, but I expect that there are errors of grammar and possibly content that I did not discover before finalizing the note.    This note was electronically signed by Yamil Arroyo PA-C

## 2023-01-15 ENCOUNTER — OFFICE VISIT (OUTPATIENT)
Dept: URGENT CARE | Facility: CLINIC | Age: 63
End: 2023-01-15

## 2023-01-15 VITALS
WEIGHT: 145 LBS | OXYGEN SATURATION: 93 % | BODY MASS INDEX: 22.76 KG/M2 | HEART RATE: 73 BPM | SYSTOLIC BLOOD PRESSURE: 122 MMHG | HEIGHT: 67 IN | RESPIRATION RATE: 18 BRPM | TEMPERATURE: 97.5 F | DIASTOLIC BLOOD PRESSURE: 88 MMHG

## 2023-01-15 DIAGNOSIS — J20.9 ACUTE BRONCHITIS WITH BRONCHOSPASM: ICD-10-CM

## 2023-01-15 PROCEDURE — 99213 OFFICE O/P EST LOW 20 MIN: CPT | Performed by: FAMILY MEDICINE

## 2023-01-15 RX ORDER — DOXYCYCLINE HYCLATE 100 MG
100 TABLET ORAL EVERY 12 HOURS
Qty: 14 TABLET | Refills: 0 | Status: SHIPPED | OUTPATIENT
Start: 2023-01-15 | End: 2023-01-22

## 2023-01-15 RX ORDER — MULTIVIT-MIN/IRON FUM/FOLIC AC 7.5 MG-4
TABLET ORAL
COMMUNITY
Start: 2022-08-25

## 2023-01-15 RX ORDER — DEXAMETHASONE 6 MG/1
6 TABLET ORAL DAILY
Qty: 5 TABLET | Refills: 0 | Status: SHIPPED | OUTPATIENT
Start: 2023-01-15 | End: 2023-02-22

## 2023-01-15 RX ORDER — ALBUTEROL SULFATE 90 UG/1
2 AEROSOL, METERED RESPIRATORY (INHALATION) EVERY 6 HOURS PRN
Qty: 8.5 G | Refills: 1 | Status: SHIPPED | OUTPATIENT
Start: 2023-01-15

## 2023-01-15 ASSESSMENT — ENCOUNTER SYMPTOMS: COUGH: 1

## 2023-01-16 ENCOUNTER — APPOINTMENT (OUTPATIENT)
Dept: URGENT CARE | Facility: CLINIC | Age: 63
End: 2023-01-16

## 2023-01-16 NOTE — PROGRESS NOTES
"Subjective     Kathy Edmonds is a 62 y.o. female who presents with Cough (Was here last week for the same cough not feeling better )      - This is a pleasant and nontoxic appearing 62 y.o. female who has come to the walk-in clinic today for:    #1) recheck on coughing. Started last month and seen here 12/25, says she seems to be getting better but then started up again full force about 1-2 weeks ago and seen here 1/7. Says only took 1 day abx and upset stomach so stopped taking    Mainly here today as is still having dry cough, mainly at night and worse at night. No associated NVFC/cp/sob, no bloody sputum,. Says feels fine otherwise.       ALLERGIES:  Patient has no known allergies.     PMH:  Past Medical History:   Diagnosis Date    Cholelithiasis     History of malignant melanoma of skin age 25        PSH:  Past Surgical History:   Procedure Laterality Date    CHOLECYSTECTOMY      OTHER      excision of melanoma left lateral rib cage area       MEDS:    Current Outpatient Medications:     Multiple Vitamins-Minerals (MULTIVITAMIN WITH MINERALS) tablet, , Disp: , Rfl:     doxycycline (VIBRAMYCIN) 100 MG Tab, Take 1 Tablet by mouth every 12 hours for 7 days., Disp: 14 Tablet, Rfl: 0    dexamethasone (DECADRON) 6 MG Tab, Take 1 Tablet by mouth every day., Disp: 5 Tablet, Rfl: 0    albuterol 108 (90 Base) MCG/ACT Aero Soln inhalation aerosol, Inhale 2 Puffs every 6 hours as needed (coughing spells)., Disp: 8.5 g, Rfl: 1    ** I have documented what I find to be significant in regards to past medical, social, family and surgical history  in my HPI or under PMH/PSH/FH review section, otherwise it is noncontributory **         HPI    Review of Systems   Respiratory:  Positive for cough.    All other systems reviewed and are negative.           Objective     /88   Pulse 73   Temp 36.4 °C (97.5 °F) (Temporal)   Resp 18   Ht 1.702 m (5' 7\")   Wt 65.8 kg (145 lb)   SpO2 93%   BMI 22.71 kg/m²      Physical " Exam  Vitals and nursing note reviewed.   Constitutional:       General: She is not in acute distress.     Appearance: Normal appearance. She is well-developed.   HENT:      Head: Normocephalic.   Cardiovascular:      Heart sounds: Normal heart sounds. No murmur heard.  Pulmonary:      Effort: Pulmonary effort is normal. No respiratory distress.      Breath sounds: Normal breath sounds.   Neurological:      Mental Status: She is alert.      Motor: No abnormal muscle tone.   Psychiatric:         Mood and Affect: Mood normal.         Behavior: Behavior normal.                           Assessment & Plan       1. Acute bronchitis with bronchospasm  DX-CHEST-2 VIEWS    doxycycline (VIBRAMYCIN) 100 MG Tab    dexamethasone (DECADRON) 6 MG Tab    albuterol 108 (90 Base) MCG/ACT Aero Soln inhalation aerosol    Referral to establish with [facility] PCP          - Dx, plan & d/c instructions discussed   - Rest, stay hydrated  - OTC Flonase x 4 weeks for sinus/ear congestion  - return this week when x-ray available to get chest x-ray  - E.R. precautions discussed     Follow up with your regular primary care providers office for a recheck on today's visit. ER if not improving in 2-3 days or if feeling/getting worse. (If you do not have a primary care provider and need to schedule one you may call [facility] at [phone] to do this).     Patient left in stable condition     Pertinent prior office visit notes in [employer] have been reviewed by me today on day of this visit.

## 2023-01-24 ENCOUNTER — TELEPHONE (OUTPATIENT)
Dept: HEALTH INFORMATION MANAGEMENT | Facility: OTHER | Age: 63
End: 2023-01-24

## 2023-02-22 ENCOUNTER — OFFICE VISIT (OUTPATIENT)
Dept: URGENT CARE | Facility: CLINIC | Age: 63
End: 2023-02-22

## 2023-02-22 VITALS
HEART RATE: 76 BPM | SYSTOLIC BLOOD PRESSURE: 118 MMHG | BODY MASS INDEX: 22.76 KG/M2 | HEIGHT: 67 IN | DIASTOLIC BLOOD PRESSURE: 88 MMHG | RESPIRATION RATE: 14 BRPM | OXYGEN SATURATION: 94 % | TEMPERATURE: 98.4 F | WEIGHT: 145 LBS

## 2023-02-22 DIAGNOSIS — J32.9 RECURRENT SINUSITIS: ICD-10-CM

## 2023-02-22 PROCEDURE — 99213 OFFICE O/P EST LOW 20 MIN: CPT | Performed by: PHYSICIAN ASSISTANT

## 2023-02-22 RX ORDER — AMOXICILLIN AND CLAVULANATE POTASSIUM 875; 125 MG/1; MG/1
1 TABLET, FILM COATED ORAL 2 TIMES DAILY
Qty: 14 TABLET | Refills: 0 | Status: SHIPPED | OUTPATIENT
Start: 2023-02-22 | End: 2023-03-01

## 2023-02-22 RX ORDER — PREDNISONE 20 MG/1
40 TABLET ORAL DAILY
Qty: 10 TABLET | Refills: 0 | Status: SHIPPED | OUTPATIENT
Start: 2023-02-22 | End: 2023-02-27

## 2023-02-22 ASSESSMENT — ENCOUNTER SYMPTOMS
HEADACHES: 0
SHORTNESS OF BREATH: 0
SORE THROAT: 0
FEVER: 0
COUGH: 1
VOMITING: 0
EYE DISCHARGE: 0
DIARRHEA: 0
SINUS PAIN: 1
NAUSEA: 0
MYALGIAS: 0
EYE REDNESS: 0

## 2023-02-23 ENCOUNTER — TELEPHONE (OUTPATIENT)
Dept: HEALTH INFORMATION MANAGEMENT | Facility: OTHER | Age: 63
End: 2023-02-23

## 2023-02-23 NOTE — PROGRESS NOTES
Subjective     Kathy Edmonds is a 62 y.o. female who presents with Sinus Problem (Earache right ear, still have sinus infection for month, loss of voice, and requesting antibiotics )            URI   This is a new problem. Episode onset: x 2-3 months - worse over the past 4 days ago. The problem has been unchanged. There has been no fever. Associated symptoms include congestion, coughing, ear pain, a plugged ear sensation and sinus pain. Pertinent negatives include no chest pain, diarrhea, headaches, nausea, sore throat or vomiting. She has tried antihistamine and NSAIDs for the symptoms.     The patient states she has been experiencing an ongoing sinus infection over the past several months, which has worsened over the past 4 days.  The patient states that she has been experiencing continued cough, congestion, and sinus pain/pressure, as well as a plugged sensation to her ears and intermittent right ear pain.  The patient has been seen in clinic on 3 separate occasions for the above symptoms.  The patient states she has been prescribed antibiotics and steroids with minimal improvement of her symptoms.  The patient states 4 days ago she developed a hoarse voice.  The patient reports no associated sore throat.  She also reports no associated fever.  The patient states she was taking a daily antihistamine, but states that she stopped taking it approximately 2 weeks ago, stating it was not working.  The patient states she has been taking OTC ibuprofen as needed.  The patient is requesting additional treatment with antibiotics and steroids at this time.    PMH:  has a past medical history of Cholelithiasis and History of malignant melanoma of skin (age 25).    She has no past medical history of Asthma.  MEDS:   Current Outpatient Medications:     Multiple Vitamins-Minerals (MULTIVITAMIN WITH MINERALS) tablet, , Disp: , Rfl:     dexamethasone (DECADRON) 6 MG Tab, Take 1 Tablet by mouth every day. (Patient not taking:  "Reported on 2/22/2023), Disp: 5 Tablet, Rfl: 0    albuterol 108 (90 Base) MCG/ACT Aero Soln inhalation aerosol, Inhale 2 Puffs every 6 hours as needed (coughing spells). (Patient not taking: Reported on 2/22/2023), Disp: 8.5 g, Rfl: 1  ALLERGIES: No Known Allergies  SURGHX:   Past Surgical History:   Procedure Laterality Date    CHOLECYSTECTOMY      OTHER      excision of melanoma left lateral rib cage area     SOCHX:  reports that she quit smoking about 8 years ago. Her smoking use included cigarettes. She has a 35.00 pack-year smoking history. She has never used smokeless tobacco. She reports that she does not currently use alcohol. She reports that she does not use drugs.  FH: Family history was reviewed, no pertinent findings to report      Review of Systems   Constitutional:  Negative for fever.   HENT:  Positive for congestion, ear pain and sinus pain. Negative for sore throat.    Eyes:  Negative for discharge and redness.   Respiratory:  Positive for cough. Negative for shortness of breath.    Cardiovascular:  Negative for chest pain.   Gastrointestinal:  Negative for diarrhea, nausea and vomiting.   Musculoskeletal:  Negative for myalgias.   Neurological:  Negative for headaches.            Objective     /88   Pulse 76   Temp 36.9 °C (98.4 °F) (Temporal)   Resp 14   Ht 1.702 m (5' 7\")   Wt 65.8 kg (145 lb)   SpO2 94%   BMI 22.71 kg/m²      Physical Exam  Constitutional:       General: She is not in acute distress.     Appearance: Normal appearance. She is not ill-appearing.   HENT:      Head: Normocephalic and atraumatic.      Right Ear: Tympanic membrane, ear canal and external ear normal.      Left Ear: Tympanic membrane, ear canal and external ear normal.      Nose: Nose normal.      Mouth/Throat:      Mouth: Mucous membranes are moist.      Pharynx: Oropharynx is clear. No posterior oropharyngeal erythema.   Eyes:      Extraocular Movements: Extraocular movements intact.      " Conjunctiva/sclera: Conjunctivae normal.   Cardiovascular:      Rate and Rhythm: Normal rate and regular rhythm.      Heart sounds: Normal heart sounds.   Pulmonary:      Effort: Pulmonary effort is normal. No respiratory distress.      Breath sounds: Normal breath sounds. No wheezing.   Musculoskeletal:         General: Normal range of motion.      Cervical back: Normal range of motion and neck supple.   Skin:     General: Skin is warm and dry.   Neurological:      Mental Status: She is alert and oriented to person, place, and time.             Progress:  Reviewed the patient's previous clinic visit notes on 12/25/2022, 1/7/2023, and 1/15/2023.             Assessment & Plan          1. Recurrent sinusitis  - amoxicillin-clavulanate (AUGMENTIN) 875-125 MG Tab; Take 1 Tablet by mouth 2 times a day for 7 days.  Dispense: 14 Tablet; Refill: 0  - predniSONE (DELTASONE) 20 MG Tab; Take 2 Tablets by mouth every day for 5 days.  Dispense: 10 Tablet; Refill: 0  - Referral back to [facility]    The patient's presenting symptoms and physical exam endings are consistent with recurrent sinusitis.  Advised the patient that her ongoing symptoms could be related to inflammation rather than infection, as she was previously treated with antibiotics.  The patient is requesting additional antibiotic treatment at this time with oral steroids.  Will prescribe the patient Augmentin and prednisone for her recurrent sinusitis.  We will also place referral to primary care for further evaluation and management.  Advised patient to monitor for worsening signs and or symptoms.  Recommend OTC medications and supportive care for symptomatic management.  I believe the patient would benefit from a daily antihistamine and daily Flonase use.  Recommend patient follow-up with primary care as above.  Discussed return precautions with patient, and she verbalized understanding.    Differential diagnoses, supportive care, and indications for immediate  follow-up discussed with patient.   Instructed to return to clinic or nearest emergency department for any change in condition, further concerns, or worsening of symptoms.    OTC Tylenol or Motrin for fever/discomfort.  OTC cough/cold medication for symptomatic relief  OTC antihistamines for symptomatic relief  OTC Flonase for symptomatic relief  OTC Supportive Care for Congestion - saline nasal spray or neti pot  Differential diagnoses, supportive care, and indications for immediate follow-up discussed with patient.   Instructed to return to clinic or nearest emergency department for any change in condition, further concerns, or worsening of symptoms.      Discussed plan with the patient, and she agrees to the above.     I personally reviewed prior external notes and test results pertinent to today's visit.  I have independently reviewed and interpreted all diagnostics ordered during this urgent care visit.     Please note that this dictation was created using voice recognition software. I have made every reasonable attempt to correct obvious errors, but I expect that there may be errors of grammar and possibly content that I did not discover before finalizing the note.     This note was electronically signed by Randa Carlos PA-C

## 2023-06-17 ENCOUNTER — OFFICE VISIT (OUTPATIENT)
Dept: URGENT CARE | Facility: CLINIC | Age: 63
End: 2023-06-17

## 2023-06-17 VITALS
HEIGHT: 67 IN | HEART RATE: 68 BPM | RESPIRATION RATE: 18 BRPM | WEIGHT: 147 LBS | TEMPERATURE: 98.1 F | SYSTOLIC BLOOD PRESSURE: 112 MMHG | BODY MASS INDEX: 23.07 KG/M2 | DIASTOLIC BLOOD PRESSURE: 64 MMHG | OXYGEN SATURATION: 91 %

## 2023-06-17 DIAGNOSIS — J20.9 ACUTE WHEEZY BRONCHITIS: ICD-10-CM

## 2023-06-17 DIAGNOSIS — J98.01 BRONCHOSPASM: ICD-10-CM

## 2023-06-17 PROCEDURE — 99213 OFFICE O/P EST LOW 20 MIN: CPT | Performed by: FAMILY MEDICINE

## 2023-06-17 PROCEDURE — 3074F SYST BP LT 130 MM HG: CPT | Performed by: FAMILY MEDICINE

## 2023-06-17 PROCEDURE — 3078F DIAST BP <80 MM HG: CPT | Performed by: FAMILY MEDICINE

## 2023-06-17 RX ORDER — ASPIRIN 325 MG
325 TABLET ORAL EVERY 6 HOURS PRN
COMMUNITY

## 2023-06-17 RX ORDER — AMOXICILLIN AND CLAVULANATE POTASSIUM 875; 125 MG/1; MG/1
1 TABLET, FILM COATED ORAL 2 TIMES DAILY
Qty: 14 TABLET | Refills: 0 | Status: SHIPPED | OUTPATIENT
Start: 2023-06-17 | End: 2023-06-24

## 2023-06-17 ASSESSMENT — ENCOUNTER SYMPTOMS: COUGH: 1

## 2023-06-17 NOTE — PROGRESS NOTES
"Subjective     Kathy Edmonds is a 62 y.o. female who presents with Cough (X3 days, post nasal drip, congestion )      - This is a pleasant and nontoxic appearing 62 y.o. person who has come to the walk-in clinic today for:    #1) 3 days cough and sinus congestion, started few weeks ago but slowly improved and then worse again past few days. No associated nvfc/cp/sob    In past says augmentin worked best for her for this. Is not interested in trying any other meds or x-rays at this time.       ALLERGIES:  Patient has no known allergies.     PMH:  Past Medical History:   Diagnosis Date    Cholelithiasis     History of malignant melanoma of skin age 25        PSH:  Past Surgical History:   Procedure Laterality Date    CHOLECYSTECTOMY      OTHER      excision of melanoma left lateral rib cage area       MEDS:    Current Outpatient Medications:     aspirin (ASA) 325 MG Tab, Take 325 mg by mouth every 6 hours as needed., Disp: , Rfl:     amoxicillin-clavulanate (AUGMENTIN) 875-125 MG Tab, Take 1 Tablet by mouth 2 times a day for 7 days., Disp: 14 Tablet, Rfl: 0    Multiple Vitamins-Minerals (MULTIVITAMIN WITH MINERALS) tablet, , Disp: , Rfl:     albuterol 108 (90 Base) MCG/ACT Aero Soln inhalation aerosol, Inhale 2 Puffs every 6 hours as needed (coughing spells)., Disp: 8.5 g, Rfl: 1    ** I have documented what I find to be significant in regards to past medical, social, family and surgical history  in my HPI or under PMH/PSH/FH review section, otherwise it is noncontributory **         HPI    Review of Systems   HENT:  Positive for congestion.    Respiratory:  Positive for cough.    All other systems reviewed and are negative.             Objective     /64   Pulse 68   Temp 36.7 °C (98.1 °F) (Temporal)   Resp 18   Ht 1.702 m (5' 7\")   Wt 66.7 kg (147 lb)   SpO2 91%   BMI 23.02 kg/m²      Physical Exam  Vitals and nursing note reviewed.   Constitutional:       General: She is not in acute distress.     " Appearance: Normal appearance. She is well-developed.   HENT:      Head: Normocephalic.      Mouth/Throat:      Mouth: Mucous membranes are moist.      Pharynx: Oropharynx is clear. No oropharyngeal exudate or posterior oropharyngeal erythema.   Cardiovascular:      Heart sounds: Normal heart sounds. No murmur heard.  Pulmonary:      Effort: Pulmonary effort is normal. No respiratory distress.      Breath sounds: Wheezing (mild end exp) present. No rhonchi or rales.      Comments: Bronchospastic like cough during exam  Chest:      Chest wall: No tenderness.   Neurological:      Mental Status: She is alert.      Motor: No abnormal muscle tone.   Psychiatric:         Mood and Affect: Mood normal.         Behavior: Behavior normal.                             Assessment & Plan       1. Acute wheezy bronchitis  amoxicillin-clavulanate (AUGMENTIN) 875-125 MG Tab    Referral back to [facility]      2. Bronchospasm  Referral back to [facility]          - Dx, plan & d/c instructions discussed   - Rest, stay hydrated    Follow up with your regular primary care providers office for a recheck on today's visit. ER if not improving in 2-3 days or if feeling/getting worse.    Any realistic side effects of medications that may have been given today reviewed.     Patient left in stable condition     Pertinent prior office visit notes in [facility] have been reviewed by me today on day of this visit.

## 2023-06-22 ENCOUNTER — TELEPHONE (OUTPATIENT)
Dept: HEALTH INFORMATION MANAGEMENT | Facility: OTHER | Age: 63
End: 2023-06-22

## 2023-06-28 ENCOUNTER — OFFICE VISIT (OUTPATIENT)
Dept: URGENT CARE | Facility: CLINIC | Age: 63
End: 2023-06-28

## 2023-06-28 VITALS
DIASTOLIC BLOOD PRESSURE: 68 MMHG | TEMPERATURE: 98.7 F | SYSTOLIC BLOOD PRESSURE: 124 MMHG | BODY MASS INDEX: 23.07 KG/M2 | HEIGHT: 67 IN | OXYGEN SATURATION: 92 % | WEIGHT: 147 LBS | HEART RATE: 70 BPM | RESPIRATION RATE: 20 BRPM

## 2023-06-28 DIAGNOSIS — J98.01 BRONCHOSPASM, ACUTE: ICD-10-CM

## 2023-06-28 DIAGNOSIS — J22 ACUTE RESPIRATORY INFECTION: ICD-10-CM

## 2023-06-28 DIAGNOSIS — R05.1 ACUTE COUGH: ICD-10-CM

## 2023-06-28 PROCEDURE — 3074F SYST BP LT 130 MM HG: CPT | Performed by: NURSE PRACTITIONER

## 2023-06-28 PROCEDURE — 99214 OFFICE O/P EST MOD 30 MIN: CPT | Performed by: NURSE PRACTITIONER

## 2023-06-28 PROCEDURE — 3078F DIAST BP <80 MM HG: CPT | Performed by: NURSE PRACTITIONER

## 2023-06-28 RX ORDER — METHYLPREDNISOLONE 4 MG/1
TABLET ORAL
Qty: 21 TABLET | Refills: 0 | Status: SHIPPED | OUTPATIENT
Start: 2023-06-28

## 2023-06-28 RX ORDER — DOXYCYCLINE HYCLATE 100 MG/1
100 CAPSULE ORAL 2 TIMES DAILY
Qty: 20 CAPSULE | Refills: 0 | Status: SHIPPED | OUTPATIENT
Start: 2023-06-28 | End: 2023-07-08

## 2023-06-28 RX ORDER — BENZONATATE 100 MG/1
100 CAPSULE ORAL 3 TIMES DAILY PRN
Qty: 60 CAPSULE | Refills: 0 | Status: SHIPPED | OUTPATIENT
Start: 2023-06-28

## 2023-06-28 NOTE — PROGRESS NOTES
"Kathy Edmonds is a 62 y.o. female who presents for Congestion (Clear/thick congestion, last visit: 23, Amox: did not work, requesting a stronger abx(Augmentin) and allergy shot,  has been having congestion for a while now, 4 weeks ago was shooting at a competition: believes it's fungal, thick mucous, almost \"hacking\" , diarrhea x 2 weeks)      HPI  This is a new problem. Kathy Edmonds is a 62 y.o. patient who presents to urgent care with c/o: coughing , nasal congestion,  ears feel full, chest congestion, Deep cough in spasms. Not resting well. Was seen in  23 and given \" Amoxicillin\" and it is not working at all. Took 2 days of medication and then stopped taking it.  She was recenty traveling in [place] and has gotten bad resp infections in the past when she returns from [place]. This is similar.  Hx of environmental allergies.  2 weeks ago had diarrhea.  Does not like taking antibiotics but Augmentin usually works well for her.  Taking probiotics. Does not use her inhalers - they don't work.   Codeine cough syrups usually work well for her. The last time she was prescribed some she could not get it from the pharmacy.   Denies fever, chills, sob, c/p, leg swelling, facial pain, headache, eye drainage.    No other aggravating or alleviating factors.       ROS See HPI    Allergies:     No Known Allergies    PMSFS Hx:  Past Medical History:   Diagnosis Date    Cholelithiasis     History of malignant melanoma of skin age 25     Past Surgical History:   Procedure Laterality Date    CHOLECYSTECTOMY      OTHER      excision of melanoma left lateral rib cage area     History reviewed. No pertinent family history.  Social History     Tobacco Use    Smoking status: Former     Packs/day: 1.00     Years: 35.00     Pack years: 35.00     Types: Cigarettes     Quit date: 2014     Years since quittin.1    Smokeless tobacco: Never   Vaping Use    Vaping Use: Never used   Substance Use Topics    Alcohol use: Not Currently " "    Comment: rare       Problems:   Patient Active Problem List   Diagnosis    H/O melanoma excision    Muscle strain       Medications:   Current Outpatient Medications on File Prior to Visit   Medication Sig Dispense Refill    aspirin (ASA) 325 MG Tab Take 325 mg by mouth every 6 hours as needed.      Multiple Vitamins-Minerals (MULTIVITAMIN WITH MINERALS) tablet       albuterol 108 (90 Base) MCG/ACT Aero Soln inhalation aerosol Inhale 2 Puffs every 6 hours as needed (coughing spells). 8.5 g 1     No current facility-administered medications on file prior to visit.          Objective:     /68 (BP Location: Left arm, Patient Position: Sitting, BP Cuff Size: Adult)   Pulse 70   Temp 37.1 °C (98.7 °F) (Temporal)   Resp 20   Ht 1.702 m (5' 7\")   Wt 66.7 kg (147 lb)   SpO2 92%   BMI 23.02 kg/m²     Physical Exam  Vitals and nursing note reviewed.   Constitutional:       General: She is not in acute distress.     Appearance: Normal appearance. She is well-developed. She is not ill-appearing or toxic-appearing.   HENT:      Head: Normocephalic.      Right Ear: Hearing normal. No middle ear effusion. Tympanic membrane is injected. Tympanic membrane is not erythematous.      Left Ear: Hearing normal.  No middle ear effusion. Tympanic membrane is injected. Tympanic membrane is not erythematous.      Nose: No mucosal edema or rhinorrhea.      Right Sinus: No maxillary sinus tenderness or frontal sinus tenderness.      Left Sinus: No maxillary sinus tenderness or frontal sinus tenderness.      Mouth/Throat:      Pharynx: Uvula midline. No posterior oropharyngeal erythema.      Tonsils: No tonsillar abscesses.   Neck:      Trachea: Trachea normal.   Cardiovascular:      Rate and Rhythm: Normal rate and regular rhythm.      Chest Wall: PMI is not displaced.      Pulses: Normal pulses.      Heart sounds: Normal heart sounds.   Pulmonary:      Effort: Pulmonary effort is normal. No respiratory distress.      Breath " "sounds: Rhonchi present. No decreased breath sounds, wheezing or rales.      Comments: Acute bronchospasm cough present during exam     Musculoskeletal:         General: Normal range of motion.      Cervical back: Full passive range of motion without pain, normal range of motion and neck supple.   Lymphadenopathy:      Cervical: No cervical adenopathy.      Upper Body:      Right upper body: No supraclavicular adenopathy.      Left upper body: No supraclavicular adenopathy.   Skin:     General: Skin is warm and dry.      Capillary Refill: Capillary refill takes less than 2 seconds.   Neurological:      Mental Status: She is alert and oriented to person, place, and time.      Gait: Gait normal.   Psychiatric:         Mood and Affect: Mood normal.         Speech: Speech normal.         Behavior: Behavior normal. Behavior is cooperative.           Assessment /Associated Orders:      1. Acute respiratory infection  doxycycline (VIBRAMYCIN) 100 MG Cap    methylPREDNISolone (MEDROL DOSEPAK) 4 MG Tablet Therapy Pack      2. Acute cough  benzonatate (TESSALON) 100 MG Cap      3. Bronchospasm, acute  methylPREDNISolone (MEDROL DOSEPAK) 4 MG Tablet Therapy Pack            Medical Decision Making:    Pt is clinically stable at today's acute urgent care visit.  No acute distress noted. Appropriate for outpatient care at this time.   Acute problem today with uncertain prognosis.    Educated in proper administration of  prescription medication(s) ordered today including safety, possible SE, risks, benefits, rationale and alternatives to therapy.   Declines promethazine -DM cough syrup for noc. \" It does't work\".   Declines CXR  Declines albuterol inhaler \" I have 3 inhalers at home that never work for me\".     Do not take additional NSAIDS or steroids while taking Medrol RX medication. Educated on risk of lower immunity in short term, weight changes, mood changes, increased serum glucose and elevated BP while taking steroids. "   Keep well hydrated   Cool mist humidifier at night prn   OTC antihistamine of choice. Follow manufactures dosing and safety guidelines.  Educated in taking all antibiotics as prescribed.       Discussed Dx, management options (risks,benefits, and alternatives to planned treatment), natural progression and supportive care.  Expressed understanding and the treatment plan was agreed upon.   Questions were encouraged and answered   Return to urgent care prn if new or worsening sx or if there is no improvement in condition prn.    Educated in Red flags and indications to immediately call 911 or present to the Emergency Department.       Time I spent evaluating Kathy Edmonds in urgent care today was 33  minutes. This time includes preparing for visit, reviewing any pertinent notes or test results, counseling/education, exam, obtaining HPI, interpretation of lab tests, medication management and documentation as indicated above.Time does not include separately billable procedures noted .       Please note that this dictation was created using voice recognition software. I have worked with consultants from the vendor as well as technical experts from [facility] to optimize the interface. I have made every reasonable attempt to correct obvious errors, but I expect that there are errors of grammar and possibly content that I did not discover before finalizing the note.  This note was electronically signed by provider

## 2023-09-18 ENCOUNTER — OFFICE VISIT (OUTPATIENT)
Dept: URGENT CARE | Facility: CLINIC | Age: 63
End: 2023-09-18

## 2023-09-18 VITALS
SYSTOLIC BLOOD PRESSURE: 154 MMHG | WEIGHT: 145 LBS | DIASTOLIC BLOOD PRESSURE: 98 MMHG | OXYGEN SATURATION: 94 % | TEMPERATURE: 98.6 F | HEIGHT: 67 IN | RESPIRATION RATE: 18 BRPM | BODY MASS INDEX: 22.76 KG/M2 | HEART RATE: 74 BPM

## 2023-09-18 DIAGNOSIS — K29.70 VIRAL GASTRITIS: ICD-10-CM

## 2023-09-18 PROCEDURE — 3080F DIAST BP >= 90 MM HG: CPT | Performed by: NURSE PRACTITIONER

## 2023-09-18 PROCEDURE — 99213 OFFICE O/P EST LOW 20 MIN: CPT | Performed by: NURSE PRACTITIONER

## 2023-09-18 PROCEDURE — 3077F SYST BP >= 140 MM HG: CPT | Performed by: NURSE PRACTITIONER

## 2023-09-18 RX ORDER — ONDANSETRON 4 MG/1
4 TABLET, ORALLY DISINTEGRATING ORAL ONCE
Status: COMPLETED | OUTPATIENT
Start: 2023-09-18 | End: 2023-09-18

## 2023-09-18 RX ORDER — ONDANSETRON 4 MG/1
4 TABLET, ORALLY DISINTEGRATING ORAL EVERY 6 HOURS PRN
Qty: 10 TABLET | Refills: 0 | Status: SHIPPED | OUTPATIENT
Start: 2023-09-18

## 2023-09-18 RX ADMIN — ONDANSETRON 4 MG: 4 TABLET, ORALLY DISINTEGRATING ORAL at 10:46

## 2023-09-18 ASSESSMENT — ENCOUNTER SYMPTOMS
ABDOMINAL PAIN: 0
NUMBER OF EPISODES OF EMESIS TODAY: 1
NAUSEA: 1
VOMITING: 1

## 2023-09-18 NOTE — PROGRESS NOTES
Subjective     Kathy Edmonds is a 62 y.o. female who presents with Emesis (X4days, Stomach hurts, thinks it may be from vomiting, feels weak, states she has not been able to eat because she will vomit it up )            Emesis  This is a new problem. Episode onset: pt reports new onset of vomiting for 4 days. no fevers or diarrhea. tolerating fluids and she is still urinating. Associated symptoms include nausea and vomiting. Pertinent negatives include no abdominal pain. She has tried rest and drinking for the symptoms. The treatment provided mild relief.       Review of Systems   Gastrointestinal:  Positive for nausea and vomiting. Negative for abdominal pain.   All other systems reviewed and are negative.           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     Employer: OTHER   Tobacco Use    Smoking status: Former     Current packs/day: 0.00     Average packs/day: 1 pack/day for 35.0 years (35.0 ttl pk-yrs)     Types: Cigarettes     Start date: 1979     Quit date: 2014     Years since quittin.3    Smokeless tobacco: Never   Vaping Use    Vaping Use: Never used   Substance and Sexual Activity    Alcohol use: Not Currently     Comment: rare    Drug use: No    Sexual activity: Not Currently     Partners: Male   Other Topics Concern    Not on file   Social History Narrative    Not on file     Social Determinants of Health     Financial Resource Strain: Not on file   Food Insecurity: Not on file   Transportation Needs: Not on file   Physical Activity:  "Not on file   Stress: Not on file   Social Connections: Not on file   Intimate Partner Violence: Not on file   Housing Stability: Not on file       Objective     BP (!) 154/98   Pulse 74   Temp 37 °C (98.6 °F) (Temporal)   Resp 18   Ht 1.702 m (5' 7\")   Wt 65.8 kg (145 lb)   SpO2 94%   BMI 22.71 kg/m²      Physical Exam  Vitals and nursing note reviewed.   Constitutional:       Appearance: Normal appearance. She is normal weight.   HENT:      Head: Normocephalic and atraumatic.      Nose: Nose normal.      Mouth/Throat:      Mouth: Mucous membranes are moist.      Pharynx: Oropharynx is clear.   Eyes:      Extraocular Movements: Extraocular movements intact.      Pupils: Pupils are equal, round, and reactive to light.   Cardiovascular:      Rate and Rhythm: Normal rate and regular rhythm.   Pulmonary:      Effort: Pulmonary effort is normal.   Abdominal:      General: Bowel sounds are decreased.      Palpations: Abdomen is soft.      Tenderness: There is no abdominal tenderness. There is no guarding or rebound.   Musculoskeletal:         General: Normal range of motion.      Cervical back: Normal range of motion.   Skin:     General: Skin is warm and dry.      Capillary Refill: Capillary refill takes less than 2 seconds.   Neurological:      General: No focal deficit present.      Mental Status: She is alert and oriented to person, place, and time. Mental status is at baseline.   Psychiatric:         Mood and Affect: Mood normal.         Speech: Speech normal.         Thought Content: Thought content normal.         Judgment: Judgment normal.                             Assessment & Plan        1. Viral gastritis  - ondansetron (ZOFRAN ODT) 4 MG TABLET DISPERSIBLE; Take 1 Tablet by mouth every 6 hours as needed for Nausea/Vomiting.  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    Pathogenesis of diagnosis discussed including typical length and natural progression.    Instructed to return to UC or nearest emergency department if symptoms fail to improve, for any change in condition, further concerns, or new concerning symptoms.  Patient states understanding of the plan of care and discharge instructions.

## 2024-04-01 ENCOUNTER — PATIENT MESSAGE (OUTPATIENT)
Dept: HEALTH INFORMATION MANAGEMENT | Facility: OTHER | Age: 64
End: 2024-04-01

## 2025-06-09 SDOH — ECONOMIC STABILITY: FOOD INSECURITY: WITHIN THE PAST 12 MONTHS, THE FOOD YOU BOUGHT JUST DIDN'T LAST AND YOU DIDN'T HAVE MONEY TO GET MORE.: NEVER TRUE

## 2025-06-09 SDOH — HEALTH STABILITY: PHYSICAL HEALTH: ON AVERAGE, HOW MANY DAYS PER WEEK DO YOU ENGAGE IN MODERATE TO STRENUOUS EXERCISE (LIKE A BRISK WALK)?: 3 DAYS

## 2025-06-09 SDOH — ECONOMIC STABILITY: FOOD INSECURITY: WITHIN THE PAST 12 MONTHS, YOU WORRIED THAT YOUR FOOD WOULD RUN OUT BEFORE YOU GOT MONEY TO BUY MORE.: NEVER TRUE

## 2025-06-09 SDOH — ECONOMIC STABILITY: INCOME INSECURITY: IN THE LAST 12 MONTHS, WAS THERE A TIME WHEN YOU WERE NOT ABLE TO PAY THE MORTGAGE OR RENT ON TIME?: NO

## 2025-06-09 SDOH — ECONOMIC STABILITY: TRANSPORTATION INSECURITY
IN THE PAST 12 MONTHS, HAS LACK OF RELIABLE TRANSPORTATION KEPT YOU FROM MEDICAL APPOINTMENTS, MEETINGS, WORK OR FROM GETTING THINGS NEEDED FOR DAILY LIVING?: NO

## 2025-06-09 SDOH — ECONOMIC STABILITY: TRANSPORTATION INSECURITY
IN THE PAST 12 MONTHS, HAS THE LACK OF TRANSPORTATION KEPT YOU FROM MEDICAL APPOINTMENTS OR FROM GETTING MEDICATIONS?: NO

## 2025-06-09 SDOH — ECONOMIC STABILITY: INCOME INSECURITY: HOW HARD IS IT FOR YOU TO PAY FOR THE VERY BASICS LIKE FOOD, HOUSING, MEDICAL CARE, AND HEATING?: NOT HARD AT ALL

## 2025-06-09 SDOH — HEALTH STABILITY: PHYSICAL HEALTH: ON AVERAGE, HOW MANY MINUTES DO YOU ENGAGE IN EXERCISE AT THIS LEVEL?: 30 MIN

## 2025-06-09 SDOH — ECONOMIC STABILITY: HOUSING INSECURITY
IN THE LAST 12 MONTHS, WAS THERE A TIME WHEN YOU DID NOT HAVE A STEADY PLACE TO SLEEP OR SLEPT IN A SHELTER (INCLUDING NOW)?: NO

## 2025-06-09 SDOH — HEALTH STABILITY: MENTAL HEALTH
STRESS IS WHEN SOMEONE FEELS TENSE, NERVOUS, ANXIOUS, OR CAN'T SLEEP AT NIGHT BECAUSE THEIR MIND IS TROUBLED. HOW STRESSED ARE YOU?: NOT AT ALL

## 2025-06-09 SDOH — ECONOMIC STABILITY: TRANSPORTATION INSECURITY
IN THE PAST 12 MONTHS, HAS LACK OF TRANSPORTATION KEPT YOU FROM MEETINGS, WORK, OR FROM GETTING THINGS NEEDED FOR DAILY LIVING?: NO

## 2025-06-09 ASSESSMENT — SOCIAL DETERMINANTS OF HEALTH (SDOH)
HOW OFTEN DO YOU ATTEND CHURCH OR RELIGIOUS SERVICES?: PATIENT DECLINED
DO YOU BELONG TO ANY CLUBS OR ORGANIZATIONS SUCH AS CHURCH GROUPS UNIONS, FRATERNAL OR ATHLETIC GROUPS, OR SCHOOL GROUPS?: YES
HOW OFTEN DO YOU ATTENT MEETINGS OF THE CLUB OR ORGANIZATION YOU BELONG TO?: 1 TO 4 TIMES PER YEAR
HOW OFTEN DO YOU GET TOGETHER WITH FRIENDS OR RELATIVES?: TWICE A WEEK
HOW OFTEN DO YOU GET TOGETHER WITH FRIENDS OR RELATIVES?: TWICE A WEEK
WITHIN THE PAST 12 MONTHS, YOU WORRIED THAT YOUR FOOD WOULD RUN OUT BEFORE YOU GOT THE MONEY TO BUY MORE: NEVER TRUE
HOW OFTEN DO YOU HAVE A DRINK CONTAINING ALCOHOL: NEVER
HOW MANY DRINKS CONTAINING ALCOHOL DO YOU HAVE ON A TYPICAL DAY WHEN YOU ARE DRINKING: PATIENT DOES NOT DRINK
HOW HARD IS IT FOR YOU TO PAY FOR THE VERY BASICS LIKE FOOD, HOUSING, MEDICAL CARE, AND HEATING?: NOT HARD AT ALL
IN A TYPICAL WEEK, HOW MANY TIMES DO YOU TALK ON THE PHONE WITH FAMILY, FRIENDS, OR NEIGHBORS?: MORE THAN THREE TIMES A WEEK
ARE YOU MARRIED, WIDOWED, DIVORCED, SEPARATED, NEVER MARRIED, OR LIVING WITH A PARTNER?: PATIENT DECLINED
ARE YOU MARRIED, WIDOWED, DIVORCED, SEPARATED, NEVER MARRIED, OR LIVING WITH A PARTNER?: PATIENT DECLINED
IN A TYPICAL WEEK, HOW MANY TIMES DO YOU TALK ON THE PHONE WITH FAMILY, FRIENDS, OR NEIGHBORS?: MORE THAN THREE TIMES A WEEK
DO YOU BELONG TO ANY CLUBS OR ORGANIZATIONS SUCH AS CHURCH GROUPS UNIONS, FRATERNAL OR ATHLETIC GROUPS, OR SCHOOL GROUPS?: YES
HOW OFTEN DO YOU ATTEND CHURCH OR RELIGIOUS SERVICES?: PATIENT DECLINED
HOW OFTEN DO YOU HAVE SIX OR MORE DRINKS ON ONE OCCASION: NEVER
HOW OFTEN DO YOU ATTENT MEETINGS OF THE CLUB OR ORGANIZATION YOU BELONG TO?: 1 TO 4 TIMES PER YEAR
IN THE PAST 12 MONTHS, HAS THE ELECTRIC, GAS, OIL, OR WATER COMPANY THREATENED TO SHUT OFF SERVICE IN YOUR HOME?: NO

## 2025-06-09 ASSESSMENT — LIFESTYLE VARIABLES
HOW OFTEN DO YOU HAVE A DRINK CONTAINING ALCOHOL: NEVER
HOW MANY STANDARD DRINKS CONTAINING ALCOHOL DO YOU HAVE ON A TYPICAL DAY: PATIENT DOES NOT DRINK
HOW OFTEN DO YOU HAVE SIX OR MORE DRINKS ON ONE OCCASION: NEVER
AUDIT-C TOTAL SCORE: 0
SKIP TO QUESTIONS 9-10: 1

## 2025-06-10 ENCOUNTER — OFFICE VISIT (OUTPATIENT)
Dept: MEDICAL GROUP | Facility: MEDICAL CENTER | Age: 65
End: 2025-06-10
Payer: MEDICARE

## 2025-06-10 VITALS
WEIGHT: 169.75 LBS | HEIGHT: 66 IN | BODY MASS INDEX: 27.28 KG/M2 | HEART RATE: 80 BPM | TEMPERATURE: 97.7 F | SYSTOLIC BLOOD PRESSURE: 138 MMHG | DIASTOLIC BLOOD PRESSURE: 80 MMHG | OXYGEN SATURATION: 94 %

## 2025-06-10 DIAGNOSIS — R49.0 HOARSENESS: Primary | ICD-10-CM

## 2025-06-10 DIAGNOSIS — Z13.6 ENCOUNTER FOR SCREENING FOR CARDIOVASCULAR DISORDERS: ICD-10-CM

## 2025-06-10 DIAGNOSIS — Z98.890 H/O MELANOMA EXCISION: ICD-10-CM

## 2025-06-10 DIAGNOSIS — Z85.820 H/O MELANOMA EXCISION: ICD-10-CM

## 2025-06-10 DIAGNOSIS — E55.9 VITAMIN D DEFICIENCY: ICD-10-CM

## 2025-06-10 DIAGNOSIS — Z12.11 SCREENING FOR COLON CANCER: ICD-10-CM

## 2025-06-10 DIAGNOSIS — Z13.1 ENCOUNTER FOR SCREENING FOR DIABETES MELLITUS: ICD-10-CM

## 2025-06-10 PROBLEM — H35.9: Status: ACTIVE | Noted: 2023-03-28

## 2025-06-10 PROBLEM — T14.8XXA MUSCLE STRAIN: Status: RESOLVED | Noted: 2018-09-17 | Resolved: 2025-06-10

## 2025-06-10 PROBLEM — Z96.1 PSEUDOPHAKIA OF BOTH EYES: Status: ACTIVE | Noted: 2024-11-21

## 2025-06-10 PROBLEM — H25.813 COMBINED FORMS OF AGE-RELATED CATARACT OF BOTH EYES: Status: ACTIVE | Noted: 2023-03-28

## 2025-06-10 PROCEDURE — 99214 OFFICE O/P EST MOD 30 MIN: CPT | Performed by: HEALTH CARE PROVIDER

## 2025-06-10 PROCEDURE — 3079F DIAST BP 80-89 MM HG: CPT | Performed by: HEALTH CARE PROVIDER

## 2025-06-10 PROCEDURE — 3075F SYST BP GE 130 - 139MM HG: CPT | Performed by: HEALTH CARE PROVIDER

## 2025-06-10 ASSESSMENT — ENCOUNTER SYMPTOMS
DIZZINESS: 0
FEVER: 0
CHILLS: 0
PALPITATIONS: 0
SHORTNESS OF BREATH: 0

## 2025-06-10 ASSESSMENT — PATIENT HEALTH QUESTIONNAIRE - PHQ9: CLINICAL INTERPRETATION OF PHQ2 SCORE: 0

## 2025-06-10 NOTE — ASSESSMENT & PLAN NOTE
Check vitamin D levels    Continue taking 5,000 IU with K2  Orders:    VITAMIN D,25 HYDROXY (DEFICIENCY); Future

## 2025-06-10 NOTE — LETTER
[facility]  [facility]  [address]  [place] [address]  Fax: [phone]   Authorization for Release/Disclosure of   Protected Health Information   Name: DEJA MCKINNEY : 1960 SSN: [id number]   Address: [address] [place] [address] [place] [address] Phone:    There are no phone numbers on file.   I authorize the entity listed below to release/disclose the PHI below to:   [facility] and [facility]   Provider or Entity Name:  [facility]   Address   City, [facility], [facility]   Phone:      Fax:     Reason for request: continuity of care   Information to be released:    [  ] LAST COLONOSCOPY,  including any PATH REPORT and follow-up  [  ] LAST FIT/COLOGUARD RESULT [  ] LAST DEXA  [  ] LAST MAMMOGRAM  [  ] LAST PAP  [  ] LAST LABS [  ] RETINA EXAM REPORT  [  ] IMMUNIZATION RECORDS  [  ] Release all info      [  ] Check here and initial the line next to each item to release ALL health information INCLUDING  _____ Care and treatment for drug and / or alcohol abuse  _____ HIV testing, infection status, or AIDS  _____ Genetic Testing    DATES OF SERVICE OR TIME PERIOD TO BE DISCLOSED: _____________  I understand and acknowledge that:  * This Authorization may be revoked at any time by you in writing, except if your health information has already been used or disclosed.  * Your health information that will be used or disclosed as a result of you signing this authorization could be re-disclosed by the recipient. If this occurs, your re-disclosed health information may no longer be protected by State or Federal laws.  * You may refuse to sign this Authorization. Your refusal will not affect your ability to obtain treatment.  * This Authorization becomes effective upon signing and will  on (date) __________.      If no date is indicated, this Authorization will  one (1) year from the signature date.    Name:  Kathy Edmonds  Signature: Date:   6/10/2025     PLEASE FAX REQUESTED RECORDS BACK TO: [phone]

## 2025-06-10 NOTE — PROGRESS NOTES
"Subjective:     CC:    Chief Complaint   Patient presents with    Establish Care    Requesting Labs    Referral Needed     Throat doctor        HISTORY OF THE PRESENT ILLNESS: Patient is a 64 y.o. female. This pleasant patient is here today to establish care and discuss hoarseness in throat following vomiting episode without bleeding about 1 year ago. Also notes that she did smoke for 50 years, up to 4 packs a day. Denies difficulty swallowing, no heartburn. States this is better in the morning and progressively worsens through the day. No pain.  His/her prior PCP was Amirah Chen.    States history of Vitamin D low, taking about 5,000 IU daily    Problem   Vitamin D Deficiency   Pseudophakia of Both Eyes   Combined Forms of Age-Related Cataract of Both Eyes   Disorder of Macula of Right Eye   H/O Melanoma Excision    Diagnosed in 1987  Has appointment coming up 6/26 with dermatology     Muscle Strain (Resolved)     Patient refuses almost all forms of health maintenance or screenings, we did have a thorough discussion regarding the value of health screenings.  She does agree to Coluard for colon cancer screening.      ROS:   Review of Systems   Constitutional:  Negative for chills, fever and malaise/fatigue.   Respiratory:  Negative for shortness of breath.    Cardiovascular:  Negative for chest pain and palpitations.   Neurological:  Negative for dizziness.         Objective:     Exam: /80   Pulse 80   Temp 36.5 °C (97.7 °F) (Temporal)   Ht 1.67 m (5' 5.75\")   Wt 77 kg (169 lb 12.1 oz)   SpO2 94%  Body mass index is 27.61 kg/m².    Physical Exam  Constitutional:       Appearance: Normal appearance.   HENT:      Head: Normocephalic.   Eyes:      Conjunctiva/sclera: Conjunctivae normal.   Neck:      Thyroid: No thyroid mass, thyromegaly or thyroid tenderness.   Cardiovascular:      Rate and Rhythm: Normal rate and regular rhythm.      Heart sounds: Normal heart sounds.   Pulmonary:      Effort: Pulmonary " effort is normal.      Breath sounds: Normal breath sounds.   Lymphadenopathy:      Cervical: No cervical adenopathy.   Neurological:      General: No focal deficit present.      Mental Status: She is alert.      Gait: Gait normal.   Psychiatric:         Mood and Affect: Mood normal.         Behavior: Behavior normal.             Assessment & Plan:   64 y.o. female with the following -    Assessment & Plan  Hoarseness  Chronic, unstable, uncertain etiology or prognosis     - concerning with her history of smoking, no related GI symptoms to make me think a trial of omeprazole would help, and the fact not present in AM   - referral to ENT for further eval  Orders:    Referral to ENT    CBC WITH DIFFERENTIAL; Future    H/O melanoma excision  Continue surveillance with derm    Orders:    CBC WITH DIFFERENTIAL; Future    Screening for colon cancer  Patient agrees to cologuard  Orders:    Cologuard® colon cancer screening    Encounter for screening for cardiovascular disorders    Orders:    CBC WITH DIFFERENTIAL; Future    Lipid Profile; Future    Encounter for screening for diabetes mellitus    Orders:    CBC WITH DIFFERENTIAL; Future    Comp Metabolic Panel; Future    Vitamin D deficiency  Check vitamin D levels    Continue taking 5,000 IU with K2  Orders:    VITAMIN D,25 HYDROXY (DEFICIENCY); Future           Return in about 3 months (around 9/10/2025) for Medicare Wellness.    Please note that this dictation was created using voice recognition software. I have made every reasonable attempt to correct obvious errors, but I expect that there are errors of grammar and possibly content that I did not discover before finalizing the note.

## 2025-06-25 ENCOUNTER — RESULTS FOLLOW-UP (OUTPATIENT)
Dept: MEDICAL GROUP | Facility: MEDICAL CENTER | Age: 65
End: 2025-06-25

## 2025-06-25 ENCOUNTER — HOSPITAL ENCOUNTER (OUTPATIENT)
Facility: MEDICAL CENTER | Age: 65
End: 2025-06-25
Attending: HEALTH CARE PROVIDER
Payer: COMMERCIAL

## 2025-06-25 DIAGNOSIS — E55.9 VITAMIN D DEFICIENCY: ICD-10-CM

## 2025-06-25 DIAGNOSIS — R49.0 HOARSENESS: ICD-10-CM

## 2025-06-25 DIAGNOSIS — Z13.6 ENCOUNTER FOR SCREENING FOR CARDIOVASCULAR DISORDERS: ICD-10-CM

## 2025-06-25 DIAGNOSIS — Z85.820 H/O MELANOMA EXCISION: ICD-10-CM

## 2025-06-25 DIAGNOSIS — Z98.890 H/O MELANOMA EXCISION: ICD-10-CM

## 2025-06-25 DIAGNOSIS — Z13.1 ENCOUNTER FOR SCREENING FOR DIABETES MELLITUS: ICD-10-CM

## 2025-06-25 LAB
25(OH)D3 SERPL-MCNC: 63 NG/ML (ref 30–100)
ALBUMIN SERPL BCP-MCNC: 4.3 G/DL (ref 3.2–4.9)
ALBUMIN/GLOB SERPL: 1.7 G/DL
ALP SERPL-CCNC: 120 U/L (ref 30–99)
ALT SERPL-CCNC: 18 U/L (ref 2–50)
ANION GAP SERPL CALC-SCNC: 12 MMOL/L (ref 7–16)
AST SERPL-CCNC: 20 U/L (ref 12–45)
BASOPHILS # BLD AUTO: 1.1 % (ref 0–1.8)
BASOPHILS # BLD: 0.07 K/UL (ref 0–0.12)
BILIRUB SERPL-MCNC: 1.1 MG/DL (ref 0.1–1.5)
BUN SERPL-MCNC: 16 MG/DL (ref 8–22)
CALCIUM ALBUM COR SERPL-MCNC: 9.5 MG/DL (ref 8.5–10.5)
CALCIUM SERPL-MCNC: 9.7 MG/DL (ref 8.5–10.5)
CHLORIDE SERPL-SCNC: 109 MMOL/L (ref 96–112)
CHOLEST SERPL-MCNC: 166 MG/DL (ref 100–199)
CO2 SERPL-SCNC: 19 MMOL/L (ref 20–33)
CREAT SERPL-MCNC: 0.62 MG/DL (ref 0.5–1.4)
EOSINOPHIL # BLD AUTO: 0.34 K/UL (ref 0–0.51)
EOSINOPHIL NFR BLD: 5.3 % (ref 0–6.9)
ERYTHROCYTE [DISTWIDTH] IN BLOOD BY AUTOMATED COUNT: 44.5 FL (ref 35.9–50)
FASTING STATUS PATIENT QL REPORTED: NORMAL
GFR SERPLBLD CREATININE-BSD FMLA CKD-EPI: 98 ML/MIN/1.73 M 2
GLOBULIN SER CALC-MCNC: 2.6 G/DL (ref 1.9–3.5)
GLUCOSE SERPL-MCNC: 88 MG/DL (ref 65–99)
HCT VFR BLD AUTO: 48.1 % (ref 37–47)
HDLC SERPL-MCNC: 54 MG/DL
HGB BLD-MCNC: 15.6 G/DL (ref 12–16)
IMM GRANULOCYTES # BLD AUTO: 0.02 K/UL (ref 0–0.11)
IMM GRANULOCYTES NFR BLD AUTO: 0.3 % (ref 0–0.9)
LDLC SERPL CALC-MCNC: 93 MG/DL
LYMPHOCYTES # BLD AUTO: 1.38 K/UL (ref 1–4.8)
LYMPHOCYTES NFR BLD: 21.6 % (ref 22–41)
MCH RBC QN AUTO: 28.9 PG (ref 27–33)
MCHC RBC AUTO-ENTMCNC: 32.4 G/DL (ref 32.2–35.5)
MCV RBC AUTO: 89.1 FL (ref 81.4–97.8)
MONOCYTES # BLD AUTO: 0.49 K/UL (ref 0–0.85)
MONOCYTES NFR BLD AUTO: 7.7 % (ref 0–13.4)
NEUTROPHILS # BLD AUTO: 4.1 K/UL (ref 1.82–7.42)
NEUTROPHILS NFR BLD: 64 % (ref 44–72)
NRBC # BLD AUTO: 0 K/UL
NRBC BLD-RTO: 0 /100 WBC (ref 0–0.2)
PLATELET # BLD AUTO: 274 K/UL (ref 164–446)
PMV BLD AUTO: 9.1 FL (ref 9–12.9)
POTASSIUM SERPL-SCNC: 4.1 MMOL/L (ref 3.6–5.5)
PROT SERPL-MCNC: 6.9 G/DL (ref 6–8.2)
RBC # BLD AUTO: 5.4 M/UL (ref 4.2–5.4)
SODIUM SERPL-SCNC: 140 MMOL/L (ref 135–145)
TRIGL SERPL-MCNC: 94 MG/DL (ref 0–149)
WBC # BLD AUTO: 6.4 K/UL (ref 4.8–10.8)

## 2025-06-25 PROCEDURE — 80053 COMPREHEN METABOLIC PANEL: CPT

## 2025-06-25 PROCEDURE — 85025 COMPLETE CBC W/AUTO DIFF WBC: CPT

## 2025-06-25 PROCEDURE — 82306 VITAMIN D 25 HYDROXY: CPT

## 2025-06-25 PROCEDURE — 80061 LIPID PANEL: CPT

## 2025-06-25 PROCEDURE — 36415 COLL VENOUS BLD VENIPUNCTURE: CPT

## 2025-06-28 LAB — NONINV COLON CA DNA+OCC BLD SCRN STL QL: NEGATIVE
